# Patient Record
Sex: MALE | Race: WHITE | URBAN - METROPOLITAN AREA
[De-identification: names, ages, dates, MRNs, and addresses within clinical notes are randomized per-mention and may not be internally consistent; named-entity substitution may affect disease eponyms.]

---

## 2017-05-08 ENCOUNTER — OFFICE VISIT (OUTPATIENT)
Dept: DERMATOLOGY | Facility: AMBULATORY SURGERY CENTER | Age: 67
End: 2017-05-08

## 2017-05-08 VITALS
BODY MASS INDEX: 31.15 KG/M2 | HEART RATE: 54 BPM | RESPIRATION RATE: 19 BRPM | HEIGHT: 72 IN | DIASTOLIC BLOOD PRESSURE: 84 MMHG | WEIGHT: 230 LBS | TEMPERATURE: 98 F | SYSTOLIC BLOOD PRESSURE: 119 MMHG

## 2017-05-08 DIAGNOSIS — C44.99 ECCRINE POROCARCINOMA OF SKIN: Primary | ICD-10-CM

## 2017-05-08 RX ORDER — CANDESARTAN 16 MG/1
TABLET ORAL DAILY
COMMUNITY

## 2017-05-08 RX ORDER — BISMUTH SUBSALICYLATE 262 MG
1 TABLET,CHEWABLE ORAL DAILY
COMMUNITY

## 2017-05-08 RX ORDER — LANOLIN ALCOHOL/MO/W.PET/CERES
1 CREAM (GRAM) TOPICAL DAILY
COMMUNITY

## 2017-05-08 RX ORDER — HYDROCHLOROTHIAZIDE 25 MG/1
TABLET ORAL
COMMUNITY
Start: 2017-04-06

## 2017-05-08 RX ORDER — GUAIFENESIN 100 MG/5ML
81 LIQUID (ML) ORAL DAILY
COMMUNITY

## 2017-05-08 RX ORDER — ASCORBIC ACID 500 MG
TABLET ORAL
COMMUNITY

## 2017-05-08 RX ORDER — AZITHROMYCIN 250 MG/1
TABLET, FILM COATED ORAL
Qty: 6 TAB | Refills: 1 | Status: SHIPPED | OUTPATIENT
Start: 2017-05-08 | End: 2020-04-30

## 2017-05-08 NOTE — PROGRESS NOTES
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 This has been present for at least one year as a small raised papule which bled intermittently, not previously treated. Biopsy confirmed the diagnosis and he presents to discuss surgical management. He is feeling well and in his usual state of health today. He has no pain, no current illnesses, no other skin concerns. His allergies medications medical and social history are reviewed by me today. I have reviewed the history, physical exam, assessment and plan by Beth Hitchcock NP and agree. He is awake alert gentleman in no distress. I examined his right lower extremity concentrating on the foot. There is no right inguinal or popliteal lymphadenopathy. His right sole of the foot has an 8 x 5 mm small mobile papule which he confirms that the location of the biopsy proven skin lesion. Eccrine porocarcinoma of the right foot. We discussed the diagnosis and indications for treatment with Mohs surgery, which is the site of the lesion and its unusual histology. The procedure was discussed. Anticipated primary closure of the wound was reviewed, his questions and concerns were answered. Surgery scheduled next week.

## 2017-05-08 NOTE — PROGRESS NOTES
Written by Rachid Ellis, as dictated by Shannan Rosenbaum [facility], [address]. Name: Antonio Rosen       Age: 61 y.o. Date: 5/8/2017    Chief Complaint: had concerns including Skin Exam.    Subjective:    HPI:  Mr.. Antonio Rosen is a 61 y.o. male who was kindly referred by Dr. Vesta Geller and presents for a consult prior to Mohs surgery for a lesion on the right medial plantar foot. The lesion appeared approximately 1 year ago. The patient has not had any prior treatment. He believed this was a slightly raised bed of capillaries that broke and bled on 2 occasions in the last year. The patient's pathology report confirms eccrine porocarcinoma arising in a poroma. He reports a history of venous stasis ulcers on that ankle. He is recently retired and has numerous vacations and activities planned for the remaining year. ROS: Consitutional: No fevers, chills  Resp: chest congestion   Dermatological : positive for - Eccrine porocarcinoma    Social History     Social History    Marital status:      Spouse name: N/A    Number of children: N/A    Years of education: N/A     Occupational History    Not on file. Social History Main Topics    Smoking status: Never Smoker    Smokeless tobacco: Never Used    Alcohol use Yes      Comment: occasional    Drug use: No    Sexual activity: Not on file     Other Topics Concern    Not on file     Social History Narrative    No narrative on file       No family history on file. Past Medical History:   Diagnosis Date    Essential hypertension     Hepatic cyst     Renal cyst     Skin cancer     Sun-damaged skin     Venous stasis        Past Surgical History:   Procedure Laterality Date    AMB POC MOHS 1 STAGE H/N/HF/G         Current Outpatient Prescriptions   Medication Sig Dispense Refill    hydroCHLOROthiazide (HYDRODIURIL) 25 mg tablet       candesartan (ATACAND) 16 mg tablet Take  by mouth daily.       glucosamine-chondroitin (ARTHX) 500-400 mg cap Take 1 Cap by mouth daily.  fish oil-omega-3 fatty acids (FISH OIL) 300-500 mg cap Take  by mouth.  multivitamin (ONE A DAY) tablet Take 1 Tab by mouth daily.  aspirin 81 mg chewable tablet Take 81 mg by mouth daily.  ascorbic acid, vitamin C, (VITAMIN C) 500 mg tablet Take  by mouth.  POTASSIUM GLUCONATE PO Take  by mouth. No Known Allergies      Objective:    Visit Vitals    /84 (BP 1 Location: Left arm, BP Patient Position: Sitting)    Pulse (!) 54    Temp 98 °F (36.7 °C) (Oral)    Resp 19    Ht 6' (1.829 m)    Wt 230 lb (104.3 kg)    BMI 31.19 kg/m2       Germania Mandel is a 61 y.o. male who appears well and in no distress. He is awake, alert, and oriented. There is no right inguinal or popliteal lymphadenopathy. A limited skin evaluation was completed including his right plantar foot. There is an 8 x 6 mm skin toned papular lesion with 4 mm central vascular appearing area on his right medial plantar mid foot. This is well healed from biopsy      Assessment/Plan:    1. Porocarcinoma, right medial plantar foot. The diagnosis and process of Mohs surgery with primary or partial closure was discussed. I reviewed cure rate and risks. Healing time and limitations on activity post-surgery were discussed. The patient's questions were answered and he understands and agrees with the management plan. This plan was reviewed with the patient and patient agrees. All questions were answered. This scribe documentation was reviewed by me and accurately reflects the examination and decisions made by me.

## 2017-05-08 NOTE — MR AVS SNAPSHOT
Visit Information Date & Time Provider Department Dept. Phone Encounter #  
 5/8/2017  8:30 AM [id number] [id number]  Your Appointments 5/16/2017 10:30 AM  
SURGERY with MD Tamica Hale [address] [facility]) Appt Note: BCC (RH) Medial Plantar Mid Foot--Dr. Laguerre [facility] [id number] [place] [id number] Upcoming Health Maintenance Date Due Hepatitis C Screening 1950 DTaP/Tdap/Td series (1 - Tdap) 5/9/1973 FOBT Q 1 YEAR AGE 50-75 5/9/2002 ZOSTER VACCINE AGE 60> 5/9/2012 INFLUENZA AGE 9 TO ADULT 8/1/2017 Allergies as of 5/8/2017  Review Complete On: 5/8/2017 By: Clay Amado MD  
 No Known Allergies Current Immunizations  Never Reviewed No immunizations on file. Not reviewed this visit Vitals BP Pulse Temp Resp Height(growth percentile) Weight(growth percentile) 119/84 (BP 1 Location: Left arm, BP Patient Position: Sitting) (!) 54 98 °F (36.7 °C) (Oral) 19 6' (1.829 m) 230 lb (104.3 kg) BMI Smoking Status 31.19 kg/m2 Never Smoker Vitals History BMI and BSA Data Body Mass Index Body Surface Area  
 31.19 kg/m 2 2.3 m 2 Your Updated Medication List  
  
   
This list is accurate as of: 5/8/17  8:59 AM.  Always use your most recent med list.  
  
  
  
  
 aspirin 81 mg chewable tablet Take 81 mg by mouth daily. ATACAND 16 mg tablet Generic drug:  candesartan Take  by mouth daily. FISH -500 mg Cap Generic drug:  fish oil-omega-3 fatty acids Take  by mouth. glucosamine-chondroitin 500-400 mg Cap Commonly known as:  20 [facility] Take 1 Cap by mouth daily. hydroCHLOROthiazide 25 mg tablet Commonly known as:  HYDRODIURIL  
  
 multivitamin tablet Commonly known as:  ONE A DAY Take 1 Tab by mouth daily. POTASSIUM GLUCONATE PO Take  by mouth. VITAMIN C 500 mg tablet Generic drug:  ascorbic acid (vitamin C) Take  by mouth. Introducing [facility] & HEALTH SERVICES! Nedaelise Cool introduces [employer] patient portal. Now you can access parts of your medical record, email your doctor's office, and request medication refills online. 1. In your internet browser, go to [email or web address] 2. Click on the First Time User? Click Here link in the Sign In box. You will see the New Member Sign Up page. 3. Enter your [employer] Access Code exactly as it appears below. You will not need to use this code after youve completed the sign-up process. If you do not sign up before the expiration date, you must request a new code. · [employer] Access Code: [email or web address] Expires: 8/6/2017  8:41 AM 
 
4. Enter the last four digits of your Social Security Number (xxxx) and Date of Birth (mm/dd/yyyy) as indicated and click Submit. You will be taken to the next sign-up page. 5. Create a [employer] too ID. This will be your [employer] login ID and cannot be changed, so think of one that is secure and easy to remember. 6. Create a [employer] too password. You can change your password at any time. 7. Enter your Password Reset Question and Answer. This can be used at a later time if you forget your password. 8. Enter your e-mail address. You will receive e-mail notification when new information is available in [address] 9. Click Sign Up. You can now view and download portions of your medical record. 10. Click the Download Summary menu link to download a portable copy of your medical information. If you have questions, please visit the Frequently Asked Questions section of the [employer] website. Remember, [employer] is NOT to be used for urgent needs. For medical emergencies, dial 911. Now available from your iPhone and Android! Please provide this summary of care documentation to your next provider. If you have any questions after today's visit, please call [phone].

## 2017-05-16 ENCOUNTER — OFFICE VISIT (OUTPATIENT)
Dept: DERMATOLOGY | Facility: AMBULATORY SURGERY CENTER | Age: 67
End: 2017-05-16

## 2017-05-16 VITALS
BODY MASS INDEX: 31.15 KG/M2 | WEIGHT: 230 LBS | DIASTOLIC BLOOD PRESSURE: 84 MMHG | TEMPERATURE: 98.1 F | HEART RATE: 62 BPM | HEIGHT: 72 IN | OXYGEN SATURATION: 97 % | SYSTOLIC BLOOD PRESSURE: 132 MMHG

## 2017-05-16 DIAGNOSIS — C44.799: Primary | ICD-10-CM

## 2017-05-16 DIAGNOSIS — C44.99 ECCRINE POROCARCINOMA OF SKIN: ICD-10-CM

## 2017-05-16 DIAGNOSIS — C44.702: ICD-10-CM

## 2017-05-16 RX ORDER — CANDESARTAN 32 MG/1
TABLET ORAL
COMMUNITY
Start: 2017-03-27 | End: 2020-04-30

## 2017-05-16 RX ORDER — LIDOCAINE HYDROCHLORIDE AND EPINEPHRINE 10; 10 MG/ML; UG/ML
3 INJECTION, SOLUTION INFILTRATION; PERINEURAL ONCE
Qty: 3 ML | Refills: 0
Start: 2017-05-16 | End: 2017-05-16

## 2017-05-16 RX ORDER — BUPIVACAINE HYDROCHLORIDE AND EPINEPHRINE 2.5; 5 MG/ML; UG/ML
INJECTION, SOLUTION INFILTRATION; PERINEURAL
Qty: 1.5 ML | Refills: 0
Start: 2017-05-16

## 2017-05-16 RX ORDER — AMOXICILLIN AND CLAVULANATE POTASSIUM 875; 125 MG/1; MG/1
TABLET, FILM COATED ORAL
Refills: 0 | COMMUNITY
Start: 2017-05-12 | End: 2020-04-30

## 2017-05-16 NOTE — MR AVS SNAPSHOT
Visit Information Date & Time Provider Department Dept. Phone Encounter #  
 5/16/2017 10:30 AM Tiki Izquierdo MD [facility] [address]057 [phone] [id number] Upcoming Health Maintenance Date Due Hepatitis C Screening 1950 DTaP/Tdap/Td series (1 - Tdap) 5/9/1973 FOBT Q 1 YEAR AGE 50-75 5/9/2002 ZOSTER VACCINE AGE 60> 5/9/2012 GLAUCOMA SCREENING Q2Y 5/9/2017 Pneumococcal 65+ High/Highest Risk (1 of 2 - PCV13) 5/9/2017 MEDICARE YEARLY EXAM 5/9/2017 INFLUENZA AGE 9 TO ADULT 8/1/2017 Allergies as of 5/16/2017  Review Complete On: 5/16/2017 By: Tiki Izquierdo MD  
 No Known Allergies Current Immunizations  Never Reviewed No immunizations on file. Not reviewed this visit You Were Diagnosed With   
  
 Codes Comments Eccrine porocarcinoma of skin    -  Primary ICD-10-CM: C44.99 
ICD-9-CM: 173.99 Vitals BP Pulse Temp Height(growth percentile) Weight(growth percentile) SpO2  
 132/84 (BP 1 Location: Left arm, BP Patient Position: Sitting) 62 98.1 °F (36.7 °C) (Oral) 6' (1.829 m) 230 lb (104.3 kg) 97% BMI Smoking Status 31.19 kg/m2 Never Smoker Vitals History BMI and BSA Data Body Mass Index Body Surface Area  
 31.19 kg/m 2 2.3 m 2 Preferred Pharmacy Pharmacy Name Phone [facility], [address] [place] [address] [place] AT [address] [place] [address] Road [phone] Your Updated Medication List  
  
   
This list is accurate as of: 5/16/17 11:10 AM.  Always use your most recent med list.  
  
  
  
  
 amoxicillin-clavulanate 875-125 mg per tablet Commonly known as:  AUGMENTIN  
TK 1 T PO BID FOR 10 DAYS  
  
 aspirin 81 mg chewable tablet Take 81 mg by mouth daily. * ATACAND 16 mg tablet Generic drug:  candesartan Take  by mouth daily. * candesartan 32 mg tablet Commonly known as:  ATACAND  
  
 azithromycin 250 mg tablet Commonly known as:  Erasto Cedeño 2 po now then 1 po until finished  Indications: bronchitis FISH -500 mg Cap Generic drug:  fish oil-omega-3 fatty acids Take  by mouth. glucosamine-chondroitin 500-400 mg Cap Commonly known as:  20 [facility] Take 1 Cap by mouth daily. hydroCHLOROthiazide 25 mg tablet Commonly known as:  HYDRODIURIL  
  
 multivitamin tablet Commonly known as:  ONE A DAY Take 1 Tab by mouth daily. POTASSIUM GLUCONATE PO Take  by mouth. VITAMIN C 500 mg tablet Generic drug:  ascorbic acid (vitamin C) Take  by mouth. * Notice: This list has 2 medication(s) that are the same as other medications prescribed for you. Read the directions carefully, and ask your doctor or other care provider to review them with you. Patient Instructions WOUND CARE INSTRUCTIONS 1. Keep the dressing clean and dry and do not remove for 48 hours. 2. Then change the dressing once a day as follows: 
a. Wash hands before and after each dressing change. b. Remove dressing and wash site gently with mild soap and water, rinse, and pat dry. 
c. Apply an ointment (Bacitracin, Polysporin, Neosporin, Petroleum jelly or Aquaphor). d. Apply a non-stick (Telfa) dressing or Band-Aid to cover the wound. Remove pressure bandage Thursday, then wash gently and apply Vaseline and a band-aid to site daily for 1 week. 3. Watch for: BLEEDING: A small amount of drainage may occur. If bleeding occurs, elevate and rest the surgery site. Apply gauze and steady pressure for 15 minutes. If bleeding continues, call this office. INFECTION: Signs of infection include increased redness, pain, warmth, drainage of pus, and fever. If this occurs, call this office. 4. Special Instructions (follow any that are checked): ·  You have stitches that need to be removed in 0 days ·  Avoid bending at the waist and heavy lifting for two days. ·  Sleep with your head elevated for the next two nights. ·  Rest the surgery site and keep it elevated as much as possible for two days. ·  You may apply an ice-pack for 10-15 minutes every waking hour for the rest of the day. ·  Eat a soft diet and avoid hot food and hot drinks for the rest of the day. ·  Other instructions: Follow up as needed Take Tylenol for pain as needed. Once the site is healed with no remaining bandages or open areas, protect your surgical site and scar from the sun, as this area will be more sensitive. Use a broad spectrum sunscreen SPF 30 or higher daily, and a chemical free product (one containing zinc oxide or titanium dioxide) is a good choice if the area is sensitive. You may begin to gently massage the surgical site in 2-3 weeks, rubbing in a circular motion along the scar. This can help reduce swelling and thickness of a scar. A scar cream may be used beginnning 1 month after the surgery. If you have any questions or concerns, please call our office Monday through Friday at [phone]. Introducing [facility] & HEALTH SERVICES! Siria Hector introduces [employer] patient portal. Now you can access parts of your medical record, email your doctor's office, and request medication refills online. 1. In your internet browser, go to [email or web address] 2. Click on the First Time User? Click Here link in the Sign In box. You will see the New Member Sign Up page. 3. Enter your [employer] Access Code exactly as it appears below. You will not need to use this code after youve completed the sign-up process. If you do not sign up before the expiration date, you must request a new code. · [employer] Access Code: [email or web address] Expires: 8/6/2017  8:41 AM 
 
4. Enter the last four digits of your Social Security Number (xxxx) and Date of Birth (mm/dd/yyyy) as indicated and click Submit. You will be taken to the next sign-up page. 5. Create a WISHI ID. This will be your WISHI login ID and cannot be changed, so think of one that is secure and easy to remember. 6. Create a WISHI password. You can change your password at any time. 7. Enter your Password Reset Question and Answer. This can be used at a later time if you forget your password. 8. Enter your e-mail address. You will receive e-mail notification when new information is available in [address] 9. Click Sign Up. You can now view and download portions of your medical record. 10. Click the Download Summary menu link to download a portable copy of your medical information. If you have questions, please visit the Frequently Asked Questions section of the WISHI website. Remember, WISHI is NOT to be used for urgent needs. For medical emergencies, dial 911. Now available from your iPhone and Android! Please provide this summary of care documentation to your next provider. If you have any questions after today's visit, please call [phone].

## 2017-05-16 NOTE — PROGRESS NOTES
Pre-op: Patient present today for the evaluation of eccrine porocarcinoma to the right sole of foot. Procedure explained with full understanding. Vitals:    05/16/17 1019   BP: 132/84   Pulse: 62   Temp: 98.1 °F (36.7 °C)   TempSrc: Oral   SpO2: 97%   Weight: 104.3 kg (230 lb)   Height: 6' (1.829 m)     preoperatively, will continue to monitor. Post-op: Written and verbal post-op wound care instructions given to patient with full understanding of care. Surgical wound bandaged with Vaseline, Telfa, , 2x2 gauze, and coverall tape. All questions and concerns addressed. Vitals stable postoperatively.

## 2017-05-16 NOTE — PROGRESS NOTES
This note is written by Kamilah Castro, as dictated by Ibis Gill. Arun Earl MD.    CC: Eccrine porocarcinoma on the right medial plantar foot    History of present illness:     Kurtis Garay is a 74 y.o. male referred by Dr. Carlyle Paz. He has a biopsy-proven eccrine porocarcinoma on the right medial plantar foot. This is a new eccrine porocarcinoma present for approximately 1 year, no prior treatment. He believed it was a slightly raised bed of capillaries that bled occasionally. Biopsy confirmed the diagnosis of eccrine porocarcinoma, and I reviewed the written pathology report. He is feeling well and in his usual state of health today. He has no pain, no current illnesses, no other skin concerns. His allergies, medications, medical, and social history are reviewed by me today. His wife is with him. Exam:     He is an awake, alert, and oriented 74 y.o. male who appears well and in no distress. I examined his right foot. He has a 6 x 3 mm pink papule on his right medial plantar foot. He confirms location. Assessment/plan:    1. Eccrine porocarcinoma, right medial foot. I discussed the diagnosis of eccrine poroarcinoma and summarized the pathology report. Mohs surgery is indicated by site and an unusual histology. The procedure was discussed, verbal and written consent were obtained. I performed the procedure. Two stages were required to reach a tumor free plane. The surgical defect was managed with intermediate repair. There were no complications. He will follow up as needed as the site heals. He asks that we send a copy of this note to Dr. Robe Ignacio at [facility]. Indications, risks, and options were discussed with Kurtis Garay preoperatively. Risks including, but not limited to: pain, bleeding, infection, tumor recurrence, scarring and damage to motor and/or sensory nerves, were discussed. Kurtis Garay chose Mohs surgery.  Kurtis Garay was an acceptable surgery candidate. Mina Perez was placed in the appropriate position on the operating table in the Mohs surgery procedure room. The area was prepped and draped in the standard manner. Gentian violet was used to outline the clinical margins of the tumor. Local anesthesia was then obtained. The grossly visible tumor was then removed, an underlying layer was excised and mapped according to the Mohs technique, and the individual specimens examined microscopically. The process was repeated until microscopic examination of the tissue specimens confirmed a tumor-free plane. Hemostasis was obtained with electrosurgery and pressure. The wound was covered between stages with moist saline gauze. Wound care instructions (written and verbal) and a follow up appointment were given to Mina Perez before discharge. Mina Perez was discharged in good condition. The wound management options of second intent healing, layered closure, local flap, and/or full thickness skin graft were discussed. Mina Perez understands the aims, risks, alternatives, and possible complications and elects to proceed with an intermediate layered closure. Wound margins were made vertical, edges undermined in the subcutaneous plane, standing cones corrected at both poles followed by layered closure. The wound was closed with buried 4-0 polysorb suture in the subcutis to reduce tension on the skin edges, and skin edges were approximated with 4-0 polysorb suture in the dermis to reduce tension on the epidermis. The final closure length was 18 mm. The wound was bandaged with a pressure bandage utilizing Petroleum ointment, Telfa, gauze and Coverroll. Wound care instructions (written and verbal) and a follow up appointment were given to Mina Perez before discharge. Mina Perez was discharged in good condition. 2. History of skin cancer.  I discussed the diagnosis and recommend routine examinations with  Marlene for surveillance. The documentation recorded by the scribe accurately reflects the service I personally performed and the decisions made by me. [facility]   OFFICE PROCEDURE PROGRESS NOTE     Chart reviewed for the following:     Moni Mcbride MD, have reviewed the History, Physical and updated the Allergic reactions for [address] performed immediately prior to start of procedure:     Moni Mcbride MD, have performed the following reviews on Jorge Quiroz prior to the start of the procedure:     * Patient was identified by name and date of birth   * Agreement on procedure being performed was verified   * Risks and Benefits explained to the patient   * Procedure site verified and marked as necessary   * Patient was positioned for comfort   * Consent was signed and verified     Time: 10:45  Date of procedure: 5/16/2017  Procedure performed by: Raymond Christian.  La Mcbride MD   Provider assisted by: LPN   Patient assisted by: self   How tolerated by patient: tolerated the procedure well with no complications   Comments: none

## 2017-05-16 NOTE — PATIENT INSTRUCTIONS
WOUND CARE INSTRUCTIONS    1. Keep the dressing clean and dry and do not remove for 48 hours. 2. Then change the dressing once a day as follows:  a. Wash hands before and after each dressing change. b. Remove dressing and wash site gently with mild soap and water, rinse, and pat dry.  c. Apply an ointment (Bacitracin, Polysporin, Neosporin, Petroleum jelly or Aquaphor). d. Apply a non-stick (Telfa) dressing or Band-Aid to cover the wound. Remove pressure bandage Thursday, then wash gently and apply Vaseline and a band-aid to site daily for 1 week. 3. Watch for:  BLEEDING: A small amount of drainage may occur. If bleeding occurs, elevate and rest the surgery site. Apply gauze and steady pressure for 15 minutes. If bleeding continues, call this office. INFECTION: Signs of infection include increased redness, pain, warmth, drainage of pus, and fever. If this occurs, call this office. 4. Special Instructions (follow any that are checked):  · [] You have stitches that need to be removed in 0 days  · [] Avoid bending at the waist and heavy lifting for two days. · [] Sleep with your head elevated for the next two nights. · [x] Rest the surgery site and keep it elevated as much as possible for two days. · [x] You may apply an ice-pack for 10-15 minutes every waking hour for the rest of the day. · [] Eat a soft diet and avoid hot food and hot drinks for the rest of the day. · [] Other instructions: Follow up as needed  Take Tylenol for pain as needed. Once the site is healed with no remaining bandages or open areas, protect your surgical site and scar from the sun, as this area will be more sensitive. Use a broad spectrum sunscreen SPF 30 or higher daily, and a chemical free product (one containing zinc oxide or titanium dioxide) is a good choice if the area is sensitive. You may begin to gently massage the surgical site in 2-3 weeks, rubbing in a circular motion along the scar.  This can help reduce swelling and thickness of a scar. A scar cream may be used beginnning 1 month after the surgery. If you have any questions or concerns, please call our office Monday through Friday at [phone].

## 2018-12-29 ENCOUNTER — ED HISTORICAL/CONVERTED ENCOUNTER (OUTPATIENT)
Dept: OTHER | Age: 68
End: 2018-12-29

## 2020-04-24 ENCOUNTER — TELEPHONE (OUTPATIENT)
Dept: HEMATOLOGY | Age: 70
End: 2020-04-24

## 2020-04-30 ENCOUNTER — VIRTUAL VISIT (OUTPATIENT)
Dept: HEMATOLOGY | Age: 70
End: 2020-04-30

## 2020-04-30 DIAGNOSIS — Q44.6 POLYCYSTIC LIVER DISEASE: Primary | ICD-10-CM

## 2020-04-30 PROBLEM — I87.2 VENOUS INSUFFICIENCY OF BOTH LOWER EXTREMITIES: Status: ACTIVE | Noted: 2020-04-30

## 2020-04-30 PROBLEM — I10 HYPERTENSION: Status: ACTIVE | Noted: 2020-04-30

## 2020-04-30 PROBLEM — Q61.3 POLYCYSTIC RENAL DISEASE: Status: ACTIVE | Noted: 2020-04-30

## 2020-04-30 NOTE — PROGRESS NOTES
[address] [facility], Randell RIVERA MD Gracelyn Hover, PA-C Cristobal Russel, [facility]P-BC     April S Prieto, [facility]   Ricco Pino LUKAS-IESHA Hector, [facility]       Almaz Lainez [facility] 136    at [address], 81 [facility], [facility] 22.    [phone]    FAX: [address] [place] [address], [address]    [phone]    FAX: [phone]       Patient Care Team:  Angelika Pinedo MD as PCP - General (Family Practice)      Problem List  Date Reviewed: 4/30/2020          Codes Class Noted    Polycystic liver disease ICD-10-CM: Q44.6  ICD-9-CM: 751.62  4/30/2020        Hypertension ICD-10-CM: I10  ICD-9-CM: 401.9  4/30/2020        Polycystic renal disease ICD-10-CM: Q61.3  ICD-9-CM: 753.12  4/30/2020        Venous insufficiency of both lower extremities ICD-10-CM: I87.2  ICD-9-CM: 459.81  4/30/2020              VIRTUAL TELEHEALTH VISIT PERFORMED DUE TO COVID-19 EPIDEMIC    CONSENT:  Nabil Galloway, who was seen by synchronous, real-time, audio-video technology, and/or his healthcare decision maker, is aware that this patient-initiated, Telehealth encounter on 4/30/2020 is a billable service, with coverage as determined by his insurance carrier. He is aware that he may receive a bill and has provided verbal consent to proceed. This patient was evaluated during a Virtual Telehealth visit. A caregiver was present if appropriate. Due to this being a TeleHealth encounter performed during the [facility] public health emergency, the physical examination was limited to that listed in the [address]. comes to see me for a second opinion regarding polycystic liver disease.   All medical records sent by the referring physicians were reviewed including imaging studies     The patient is a 81 y.o.  male without any history of previous liver disease. The patient underwent an ultrasound and CT for evaluation of non-specific abdominal pain right. This demonstrated polycystic liver disease with numerous cysts of various sizes and polycytic renal disease. There is a single large liver cyst measuring 10 x 11 cm. The patient has the following symptoms which are thought to be due to the liver disease:  Fullness in the abdomen, early satiety, increased GERD. The patient is not currently experiencing the following symptoms of liver disease:  pain in the right side over the liver,     The patient completes all daily activities without any functional limitations. ASSESSMENT AND PLAN:  Polycystic liver disease   The patient has innumerable cysts of varying sizes throughout out the entire liver. Liver transaminases are normal.  ALP is normal.  Liver function is normal.  The platelet count is normal.      Liver cysts are common and benign over 99% of the time. They generally do not grow, but may may get larger over time. Patients with polycystic liver disease never develop liver dysfunction and the cysts do not lead to liver failure or cirrhosis. These patients can develop massive hepatomegaly from all the cysts which can be both disfiguring and uncomfortable from severe abdominal distention. Surgical resection of the cysts is not possible since they are throughout the liver and if only a part of the liver is resected it will regrow with cysts. The patient does have one large dominant cyst.  The reports do not indicate the location of this one cyst.  If in the left lobe this could be causing the symptoms of early satiety and GERD. This one cyst could be aspirated to se if the symptoms resolve.   The cyst will eventually refill with fluid and if it then causes symptoms again this could be surgically resected. Screening for Hepatocellular Carcinoma  HCC screening is not necessary since patients with PCLD do not develop HCC. Treatment of other medical problems in patients with chronic liver disease  There are no contraindications for the patient to take most medications that are necessary for treatment of other medical issues. Counseling for alcohol in patients with chronic liver disease  The patient was counseled regarding alcohol consumption and the effect of alcohol on chronic liver disease. The patient does not consume any significant amount of alcohol. Vaccinations   Routine vaccinations against other bacterial and viral agents can be performed as indicated. Annual flu vaccination should be administered if indicated. ALLERGIES  No Known Allergies    MEDICATIONS  Current Outpatient Medications   Medication Sig    hydroCHLOROthiazide (HYDRODIURIL) 25 mg tablet     candesartan (ATACAND) 16 mg tablet Take  by mouth daily.  multivitamin (ONE A DAY) tablet Take 1 Tab by mouth daily.  aspirin 81 mg chewable tablet Take 81 mg by mouth daily.  ascorbic acid, vitamin C, (VITAMIN C) 500 mg tablet Take  by mouth.  bupivacaine-EPINEPHrine (MARCAINE-EPINEPHRINE) 0.25 %-1:200,000 soln Used for mohs surgery  Indications: LOCAL ANESTHESIA FOR PROCEDURES    glucosamine-chondroitin (ARTHX) 500-400 mg cap Take 1 Cap by mouth daily.  fish oil-omega-3 fatty acids (FISH OIL) 300-500 mg cap Take  by mouth.  POTASSIUM GLUCONATE PO Take  by mouth. No current facility-administered medications for this visit. SYSTEM REVIEW NOT RELATED TO LIVER DISEASE OR REVIEWED ABOVE:  Constitution systems: Negative for fever, chills, weight gain, weight loss. Eyes: Negative for visual changes. ENT: Negative for sore throat, painful swallowing. Respiratory: Negative for cough, hemoptysis, SOB. Cardiology: Negative for chest pain, palpitations.   GI:  Negative for constipation or diarrhea. : Negative for urinary frequency, dysuria, hematuria, nocturia. Skin: Negative for rash. Hematology: Negative for easy bruising, blood clots. Musculo-skelatal: Negative for back pain, muscle pain, weakness. Neurologic: Negative for headaches, dizziness, vertigo, memory problems not related to HE. Psychology: Negative for anxiety, depression. FAMILY HISTORY:  The father had the following following chronic disease(s): Polycystic liver disease. The daughter had polycystic liver disease. SOCIAL HISTORY:  The patient is . The patient has 1 child. The patient has never used tobacco products. The patient has never consumed significant amounts of alcohol. 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 Eyes: Sclera anicteric. ENT: No oral lesions. Skin: No rashes. spider angiomata. No jaundice. Abdomen: No obvious distention suggesting ascites. Extremities: No edema. No muscle wasting. Neurologic: Alert and oriented. Cranial nerves grossly intact. LABORATORY STUDIES:  From 12/2019  AST/ALT/ALP/T Bili/ALB:  28/30/135/0.5/4.2  WBC/HB/PLT/INR:  5.0/12.6/58  NA/BUN/CREAT:  141/20/    SEROLOGIES:  Not available or performed. Testing was performed today. LIVER HISTOLOGY:  Not available or performed    ENDOSCOPIC PROCEDURES:  Not available or performed    RADIOLOGY:  1/2020. Ultrasound of liver. Normal appearing liver. Numerous cysts throughout the liver. The largest cyst is 11 x 10 cm. Multiple cysts in the kidneys.     OTHER TESTING:  Not available or performed    FOLLOW-UP:  Pursuant to the emergency declaration under the [facility] waiver authority and the [employer] Act, this Virtual  Visit was conducted, with the patient's (and/or their legal guardian's) consent, to reduce the patient's risk of exposure to COVID-19 and provide necessary medical care. Services were provided through a video synchronous discussion virtually to substitute for an in-person clinic visit. The patient was located in their home. The provider was located in the [place] [address] office. FOLLOW-UP:  All of the issues listed above in the Assessment and Plan were discussed with the patient. All questions were answered. The patient expressed a clear understanding of the above. No follow-up at [address] is needed. I would be glad to see the patient back for follow-up at any time in the future if the clinical situation changes.       MD Lupe GoddardThe [facility] 13 of [address] [place] [address] A, [address] [place] [address] 22.  [phone]  [address] [place] [address]

## 2022-03-18 PROBLEM — Q44.6 POLYCYSTIC LIVER DISEASE: Status: ACTIVE | Noted: 2020-04-30

## 2022-03-19 PROBLEM — I10 HYPERTENSION: Status: ACTIVE | Noted: 2020-04-30

## 2022-03-19 PROBLEM — I87.2 VENOUS INSUFFICIENCY OF BOTH LOWER EXTREMITIES: Status: ACTIVE | Noted: 2020-04-30

## 2022-03-19 PROBLEM — Q61.3 POLYCYSTIC RENAL DISEASE: Status: ACTIVE | Noted: 2020-04-30

## 2022-09-07 ENCOUNTER — TELEPHONE (OUTPATIENT)
Dept: HEMATOLOGY | Age: 72
End: 2022-09-07

## 2022-09-07 NOTE — TELEPHONE ENCOUNTER
Patient called to schedule a F/U with MLS LOV 4/30/2020. He's having some minimal pain and wanted to get it checked.   He wants to know if he needs to have an US since it's been so long

## 2022-11-15 ENCOUNTER — OFFICE VISIT (OUTPATIENT)
Dept: HEMATOLOGY | Age: 72
End: 2022-11-15
Payer: MEDICARE

## 2022-11-15 VITALS
RESPIRATION RATE: 17 BRPM | WEIGHT: 231 LBS | BODY MASS INDEX: 31.29 KG/M2 | DIASTOLIC BLOOD PRESSURE: 87 MMHG | HEART RATE: 61 BPM | SYSTOLIC BLOOD PRESSURE: 165 MMHG | HEIGHT: 72 IN | OXYGEN SATURATION: 100 % | TEMPERATURE: 97.2 F

## 2022-11-15 DIAGNOSIS — Q44.6 POLYCYSTIC LIVER DISEASE: Primary | ICD-10-CM

## 2022-11-15 PROBLEM — M23.205 OLD BUCKET HANDLE TEAR OF MEDIAL MENISCUS: Status: ACTIVE | Noted: 2022-11-15

## 2022-11-15 PROBLEM — J30.9 ALLERGIC RHINITIS: Status: ACTIVE | Noted: 2022-11-15

## 2022-11-15 PROBLEM — H52.229 REGULAR ASTIGMATISM: Status: ACTIVE | Noted: 2022-11-15

## 2022-11-15 PROBLEM — I25.10 CORONARY ATHEROSCLEROSIS: Status: ACTIVE | Noted: 2022-11-15

## 2022-11-15 PROBLEM — M79.609 PAIN IN SOFT TISSUES OF LIMB: Status: ACTIVE | Noted: 2022-11-15

## 2022-11-15 PROBLEM — M77.10 LATERAL EPICONDYLITIS OF ELBOW: Status: ACTIVE | Noted: 2022-11-15

## 2022-11-15 PROBLEM — H52.4 PRESBYOPIA: Status: ACTIVE | Noted: 2022-11-15

## 2022-11-15 PROBLEM — H52.10 MYOPIA: Status: ACTIVE | Noted: 2022-11-15

## 2022-11-15 PROBLEM — M23.50: Status: ACTIVE | Noted: 2022-11-15

## 2022-11-15 PROBLEM — I10 ESSENTIAL HYPERTENSION: Status: ACTIVE | Noted: 2017-08-11

## 2022-11-15 PROBLEM — L98.8 OTHER SPECIFIED DERMATOSES: Status: ACTIVE | Noted: 2022-11-15

## 2022-11-15 PROBLEM — E78.00 HYPERCHOLESTEROLEMIA: Status: ACTIVE | Noted: 2017-08-11

## 2022-11-15 PROBLEM — R49.8 OTHER VOICE DISTURBANCE: Status: ACTIVE | Noted: 2022-11-15

## 2022-11-15 PROBLEM — L98.499 CHRONIC ULCER OF SKIN (HCC): Status: ACTIVE | Noted: 2022-11-15

## 2022-11-15 PROBLEM — R21 RASH AND OTHER NONSPECIFIC SKIN ERUPTION: Status: ACTIVE | Noted: 2022-11-15

## 2022-11-15 PROBLEM — M25.569 KNEE PAIN: Status: ACTIVE | Noted: 2019-10-21

## 2022-11-15 PROBLEM — E66.9 OBESITY: Status: ACTIVE | Noted: 2022-11-15

## 2022-11-15 PROBLEM — F52.8 PSYCHOSEXUAL DYSFUNCTION WITH INHIBITED SEXUAL EXCITEMENT: Status: ACTIVE | Noted: 2022-11-15

## 2022-11-15 PROBLEM — E78.5 OTHER AND UNSPECIFIED HYPERLIPIDEMIA: Status: ACTIVE | Noted: 2022-11-15

## 2022-11-15 PROBLEM — R94.31 ABNORMAL ELECTROCARDIOGRAPHY: Status: ACTIVE | Noted: 2017-08-11

## 2022-11-15 PROBLEM — T14.8XXA CONTUSION: Status: ACTIVE | Noted: 2022-11-15

## 2022-11-15 PROBLEM — T46.5X5A: Status: ACTIVE | Noted: 2022-11-15

## 2022-11-15 PROCEDURE — 1101F PT FALLS ASSESS-DOCD LE1/YR: CPT | Performed by: INTERNAL MEDICINE

## 2022-11-15 PROCEDURE — G8754 DIAS BP LESS 90: HCPCS | Performed by: INTERNAL MEDICINE

## 2022-11-15 PROCEDURE — 3079F DIAST BP 80-89 MM HG: CPT | Performed by: INTERNAL MEDICINE

## 2022-11-15 PROCEDURE — G8417 CALC BMI ABV UP PARAM F/U: HCPCS | Performed by: INTERNAL MEDICINE

## 2022-11-15 PROCEDURE — 99214 OFFICE O/P EST MOD 30 MIN: CPT | Performed by: INTERNAL MEDICINE

## 2022-11-15 PROCEDURE — G8536 NO DOC ELDER MAL SCRN: HCPCS | Performed by: INTERNAL MEDICINE

## 2022-11-15 PROCEDURE — G0463 HOSPITAL OUTPT CLINIC VISIT: HCPCS | Performed by: INTERNAL MEDICINE

## 2022-11-15 PROCEDURE — 3074F SYST BP LT 130 MM HG: CPT | Performed by: INTERNAL MEDICINE

## 2022-11-15 PROCEDURE — G8427 DOCREV CUR MEDS BY ELIG CLIN: HCPCS | Performed by: INTERNAL MEDICINE

## 2022-11-15 PROCEDURE — 3017F COLORECTAL CA SCREEN DOC REV: CPT | Performed by: INTERNAL MEDICINE

## 2022-11-15 PROCEDURE — G8753 SYS BP > OR = 140: HCPCS | Performed by: INTERNAL MEDICINE

## 2022-11-15 PROCEDURE — 1123F ACP DISCUSS/DSCN MKR DOCD: CPT | Performed by: INTERNAL MEDICINE

## 2022-11-15 PROCEDURE — G8510 SCR DEP NEG, NO PLAN REQD: HCPCS | Performed by: INTERNAL MEDICINE

## 2022-11-15 RX ORDER — TERBINAFINE HYDROCHLORIDE 250 MG/1
250 TABLET ORAL DAILY
COMMUNITY
Start: 2022-09-01 | End: 2022-12-06

## 2022-11-15 RX ORDER — LIFITEGRAST 50 MG/ML
SOLUTION/ DROPS OPHTHALMIC
COMMUNITY
Start: 2022-11-08

## 2022-11-15 NOTE — PROGRESS NOTES
[address] \[facility]", Ronnie RIVERA, Valery Frye MD Narciso Jumper, PA-C    Jerson Lamar, ACNP-BC     Aileen Moreau, [facility]   Shruti Arroyo, FNP-C    Laurent Stout, [facility]       Almaz Lainez [facility] 136    at [address], [facility] Antonio Alston  22.    [phone]    FAX: [address] [place] [address] [address], [address] [place] [address], [address] [place] [address] Street [address]    [phone]    FAX: [phone]       Patient Care Team:  Richar Jerez MD as PCP - General (Family Medicine)      Problem List  Date Reviewed: 4/30/2020            Codes Class Noted    Allergic rhinitis ICD-10-CM: J30.9  ICD-9-CM: 477.9  11/15/2022        Chronic ulcer of skin ([facility] ICD-10-CM: L98.499  ICD-9-CM: 707.9  11/15/2022        Contusion ICD-10-CM: T14. 8XXA  ICD-9-CM: 924.9  11/15/2022        Coronary atherosclerosis ICD-10-CM: I25.10  ICD-9-CM: 414.00  11/15/2022        Pain in soft tissues of limb ICD-10-CM: M79.609  ICD-9-CM: 729.5  11/15/2022        Other and unspecified hyperlipidemia ICD-10-CM: E78.5  ICD-9-CM: 272.4  11/15/2022        Lateral epicondylitis of elbow ICD-10-CM: M77.10  ICD-9-CM: 726.32  11/15/2022    Overview Signed 11/15/2022 12:26 PM by Georgi Mcdermott     patient will consult with his physician collegues at work             Myopia ICD-10-CM: H52.10  ICD-9-CM: 367.1  11/15/2022        Obesity ICD-10-CM: E66.9  ICD-9-CM: 278.00  11/15/2022        Old bucket handle tear of medial meniscus ICD-10-CM: M23.205  ICD-9-CM: 717.0  11/15/2022        Old disruption of medial collateral ligament ICD-10-CM: M23.50  ICD-9-CM: 717.82  11/15/2022        Other antihypertensive agents causing adverse effect in therapeutic use ICD-10-CM: T46.5X5A  ICD-9-CM: E942.6  11/15/2022    Overview Signed 11/15/2022 12:26 PM by Robet Click     cough with ACE med             Other specified dermatoses ICD-10-CM: L98.8  ICD-9-CM: 702.8  11/15/2022    Overview Signed 11/15/2022 12:26 PM by Robet Click     skin lesion on Lt cheek             Other voice disturbance ICD-10-CM: R49.8  ICD-9-CM: 784.49  11/15/2022        Presbyopia ICD-10-CM: H52.4  ICD-9-CM: 367.4  11/15/2022        Psychosexual dysfunction with inhibited sexual excitement ICD-10-CM: F52.8  ICD-9-CM: 302.72  11/15/2022        Rash and other nonspecific skin eruption ICD-10-CM: R21  ICD-9-CM: 782.1  11/15/2022        Reason for consultation ICD-10-CM: Adwoa Moore  ICD-9-CM: Adwoa Moore  11/15/2022        Regular astigmatism ICD-10-CM: H52.229  ICD-9-CM: 367.21  11/15/2022    Overview Signed 11/15/2022 12:26 PM by Robet Click     Rx manifest             Polycystic liver disease ICD-10-CM: Q44.6  ICD-9-CM: 751.62  4/30/2020        Hypertension ICD-10-CM: I10  ICD-9-CM: 401.9  4/30/2020        Polycystic renal disease ICD-10-CM: Q61.3  ICD-9-CM: 753.12  4/30/2020        Venous insufficiency of both lower extremities ICD-10-CM: I87.2  ICD-9-CM: 459.81  4/30/2020        Knee pain ICD-10-CM: M25.569  ICD-9-CM: 719.46  10/21/2019    Overview Signed 11/15/2022 12:26 PM by Kallie Graf             Abnormal electrocardiography ICD-10-CM: R94.31  ICD-9-CM: 794.31  8/11/2017        Hypercholesterolemia ICD-10-CM: E78.00  ICD-9-CM: 272.0  8/11/2017        Essential hypertension ICD-10-CM: I10  ICD-9-CM: 401.9  8/11/2017    Overview Signed 11/15/2022 12:26 PM by Jameson Mann     cough with lisinopril with change meds to arb                Helder Quevedo [facility] for management of Polycystic liver disease. The active problem list, all pertinent past medical history, medications, radiologic findings and laboratory findings related to the liver disorder were reviewed and discussed with the patient. The patient is a 81 y.o.  male with polycystic liver and kidney disease. The patient underwent an ultrasound and CT for evaluation of non-specific right sided abdominal pain right in 2/2020. This demonstrated numerous cysts of various sizes in the liver and polycytic renal disease. There is a single large liver cyst measuring 10 x 11 cm. [facility] 4[facility]2020. A repeat CT scan in 11/2022 again demonstrated polycystic liver. The largest cyst in the right lobe was ~15 cm. The largest cyst in what appears to be the left lobe was 10 x 15 cm    The patient has the following symptoms which are thought to be due to the liver disease:    Fullness in the abdomen, early satiety, increased GERD. The patient is not currently experiencing the following symptoms of liver disease:    pain in the right side over the liver,     The patient completes all daily activities without any functional limitations. ASSESSMENT AND PLAN:  Polycystic liver disease   The patient has innumerable cysts of varying sizes throughout out the entire liver. The 2 largest cysts appear to have increased in size compared to 2020 when they were 12 x 13.7 cm in the right lobe and 7.1 x 10.7 cm in the left lobe. These now measures 15 cm in the right and 10 x 15 cm in the left. Liver transaminases are normal.  ALP is normal.  Liver function is normal.  The platelet count is normal.      Liver cysts are common and benign over 99% of the time. They generally do not grow, but may may get larger over time. Patients with polycystic liver disease never develop liver dysfunction and the cysts do not lead to liver failure or cirrhosis. These patients can develop massive hepatomegaly from all the cysts which can be both disfiguring and uncomfortable from severe abdominal distention. It is unlikely the 2 largest cysts can be removed. However, they can probably be drained with a marsupial procedure. However, given that there are so many cysts within the liver this may not cause any symptomatic benefit, or the benefit may be short lived as other cysts grow to take up space of the 2 large drained cysts. Will refer to Dr Anne-Marie Roberts for a discussion regarding the pros, cons and potential benefits of surgical drainage. Elevation in   This is not likely to be due to any intrahepatic or pancreatic malignancy since no liver or pancreas mass was seen. Polycystic liver disease not increase the risk of CCA. This is most likely due to the numerous cysts causing mild bile duct compression and should be monitored. Elevation in ALP  This is due to multiple cysts causing bile duct compression and is not uncommon in polycystic liver. No treatment is indicated. MRI to look at bile ducts is not likely to be useful. Chronic kidney disease  This is probably from polycystic renal disease. The serum creatinine appears to be stable in the 1.4 mg range. NSAIDs should not be utilized as this may worsen CKD. Screening for Hepatocellular Carcinoma  HCC screening is not necessary since patients with PCLD do not develop HCC. Treatment of other medical problems in patients with chronic liver disease  There are no contraindications for the patient to take most medications that are necessary for treatment of other medical issues. Counseling for alcohol in patients with chronic liver disease  The patient was counseled regarding alcohol consumption and the effect of alcohol on chronic liver disease. The patient does not consume any significant amount of alcohol. Vaccinations   Routine vaccinations against other bacterial and viral agents can be performed as indicated. Annual flu vaccination should be administered if indicated.     ALLERGIES  No Known Allergies    MEDICATIONS  Current Outpatient Medications   Medication Sig    Xiidra 5 % dpet     terbinafine HCL (LAMISIL) 250 mg tablet Take 250 mg by mouth daily. bupivacaine-EPINEPHrine (MARCAINE-EPINEPHRINE) 0.25 %-1:200,000 soln Used for mohs surgery  Indications: LOCAL ANESTHESIA FOR PROCEDURES    hydroCHLOROthiazide (HYDRODIURIL) 25 mg tablet     candesartan (ATACAND) 16 mg tablet Take  by mouth daily. glucosamine-chondroitin (ARTHX) 500-400 mg cap Take 1 Cap by mouth daily. fish oil-omega-3 fatty acids (FISH OIL) 300-500 mg cap Take  by mouth.    multivitamin (ONE A DAY) tablet Take 1 Tab by mouth daily. aspirin 81 mg chewable tablet Take 81 mg by mouth daily. ascorbic acid, vitamin C, (VITAMIN C) 500 mg tablet Take  by mouth. POTASSIUM GLUCONATE PO Take  by mouth. No current facility-administered medications for this visit. SYSTEM REVIEW NOT RELATED TO LIVER DISEASE OR REVIEWED ABOVE:  Constitution systems: Negative for fever, chills, weight gain, weight loss. Eyes: Negative for visual changes. ENT: Negative for sore throat, painful swallowing. Respiratory: Negative for cough, hemoptysis, SOB. Cardiology: Negative for chest pain, palpitations. GI:  Negative for constipation or diarrhea. : Negative for urinary frequency, dysuria, hematuria, nocturia. Skin: Negative for rash. Hematology: Negative for easy bruising, blood clots. Musculo-skelatal: Negative for back pain, muscle pain, weakness. Neurologic: Negative for headaches, dizziness, vertigo, memory problems not related to HE. Psychology: Negative for anxiety, depression. FAMILY HISTORY:  The father had the following following chronic disease(s): Polycystic liver disease. The daughter had polycystic liver disease. SOCIAL HISTORY:  The patient is . The patient has 1 child. The patient has never used tobacco products. The patient has never consumed significant amounts of alcohol. 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 Eyes: Sclera anicteric. ENT: No oral lesions. Thyroid normal.  Nodes: No adenopathy. Skin: No spider angiomata. No jaundice. No palmar erythema. Respiratory: Lungs clear to auscultation. Cardiovascular: Regular heart rate. No murmurs. No JVD. Abdomen: Hepatomegaly with nodular surface. Spleen not palpable. No obvious ascites. Extremities: No edema. No muscle wasting. No gross arthritic changes. Neurologic: Alert and oriented. Cranial nerves grossly intact. No asterixis. LABORATORY STUDIES:  Liver [place] of [address] Units 11/15/2022   WBC 3.4 - 10.8 x10E3/uL 5.7   ANC 1.4 - 7.0 x10E3/uL 3.7   HGB 13.0 - 17.7 g/dL 12.8 (L)    - 450 x10E3/uL 210   INR 0.9 - 1.2 1.0   AST 0 - 40 IU/L 26   ALT 0 - 44 IU/L 24   Alk Phos 44 - 121 IU/L 154 (H)   Bili, Total 0.0 - 1.2 mg/dL 0.6   Bili, Direct 0.00 - 0.40 mg/dL 0.17   Albumin 3.8 - 4.8 g/dL 4.8   BUN 8 - 27 mg/dL 26   Creat 0.76 - 1.27 mg/dL 1.43 (H)   Na 134 - 144 mmol/L 139   K 3.5 - 5.2 mmol/L 4.3   Cl 96 - 106 mmol/L 98   CO2 20 - 29 mmol/L 23   Glucose 70 - 99 mg/dL 98     Cancer Screening Latest Ref Rng & Units 11/15/2022   AFP, Serum 0.0 - 8.4 ng/mL 1.4   AFP-L3% 0.0 - 9.9 % Comment   CA 19-9 0 - 35 U/mL 97 (H)     SEROLOGIES:  Not available or performed. Testing was performed today. LIVER HISTOLOGY:  Not available or performed    ENDOSCOPIC PROCEDURES:  Not available or performed    RADIOLOGY:  1/2020. Ultrasound of liver. Normal appearing liver. Numerous cysts throughout the liver. The largest cyst is 11 x 10 cm. Multiple cysts in the kidneys. 11/2022. CT scan abdomen without IV contrast.  Numerous cysts throughout the liver and kidney consistent with polycystic liver and kidney disease. Largest cysts in right lobe 15 cm.   Largest cyst in left lobe 10 x 15 cm.    OTHER TESTING:  Not available or performed    FOLLOW-UP:  All of the issues listed above in the Assessment and Plan were discussed with the patient. All questions were answered. The patient expressed a clear understanding of the above. [address] [place] [address] in 6 months for routine monitoring.       Kamilah Meeks MD  [address] [place] [address] [address] 22.  [phone]  [address]

## 2022-11-15 NOTE — PROGRESS NOTES
Identified pt with two pt identifiers(name and ). Reviewed record in preparation for visit and have obtained necessary documentation. Chief Complaint   Patient presents with    Other     Polycystic liver disease   follow up, last visit 20      Vitals:    11/15/22 1224 11/15/22 1248   BP: (!) 156/101 (!) 165/87   Pulse: 61    Resp: 17    Temp: 97.2 °F (36.2 °C)    TempSrc: Temporal    SpO2: 100%    Weight: 231 lb (104.8 kg)    Height: 6' (1.829 m)    PainSc:   5    PainLoc: Abdomen      Patient reports decreased appetite and consistent upper abdominal pain due to cyst. Family history of renal cysts      Health Maintenance Review: Patient reminded of \"due or due soon\" health maintenance. I have asked the patient to contact his/her primary care provider (PCP) for follow-up on his/her health maintenance. Coordination of Care Questionnaire:  :   1) Have you been to an emergency room, urgent care, or hospitalized since your last visit? If yes, where when, and reason for visit? no       2. Have seen or consulted any other health care provider since your last visit? If yes, where when, and reason for visit? YES, CMP every 6 mo with PCP      Patient is accompanied by wife I have received verbal consent from Liliam Darden to discuss any/all medical information while they are present in the room.

## 2022-11-15 NOTE — Clinical Note
12/6/2022    Patient: Kymberly Munoz   YOB: 1950   Date of Visit: 11/15/2022     Carlos Barker MD  [address] [address]  Via Fax: [phone]    Dear Carlos Barker MD,      Thank you for [facility]. My notes for this consultation are attached. If you have questions, please do not hesitate to call me. I look forward to following your patient along with you.       Sincerely,    Ivet Ramsay MD

## 2022-11-16 LAB
AFP L3 MFR SERPL: NORMAL % (ref 0–9.9)
AFP SERPL-MCNC: 1.4 NG/ML (ref 0–8.4)
ALBUMIN SERPL-MCNC: 4.8 G/DL (ref 3.8–4.8)
ALP SERPL-CCNC: 154 IU/L (ref 44–121)
ALT SERPL-CCNC: 24 IU/L (ref 0–44)
AST SERPL-CCNC: 26 IU/L (ref 0–40)
BASOPHILS # BLD AUTO: 0 X10E3/UL (ref 0–0.2)
BASOPHILS NFR BLD AUTO: 1 %
BILIRUB DIRECT SERPL-MCNC: 0.17 MG/DL (ref 0–0.4)
BILIRUB SERPL-MCNC: 0.6 MG/DL (ref 0–1.2)
BUN SERPL-MCNC: 26 MG/DL (ref 8–27)
BUN/CREAT SERPL: 18 (ref 10–24)
CALCIUM SERPL-MCNC: 10.3 MG/DL (ref 8.6–10.2)
CANCER AG19-9 SERPL-ACNC: 97 U/ML (ref 0–35)
CHLORIDE SERPL-SCNC: 98 MMOL/L (ref 96–106)
CO2 SERPL-SCNC: 23 MMOL/L (ref 20–29)
CREAT SERPL-MCNC: 1.43 MG/DL (ref 0.76–1.27)
EGFR: 53 ML/MIN/1.73
EOSINOPHIL # BLD AUTO: 0.4 X10E3/UL (ref 0–0.4)
EOSINOPHIL NFR BLD AUTO: 6 %
ERYTHROCYTE [DISTWIDTH] IN BLOOD BY AUTOMATED COUNT: 13.6 % (ref 11.6–15.4)
GLUCOSE SERPL-MCNC: 98 MG/DL (ref 70–99)
HCT VFR BLD AUTO: 37.2 % (ref 37.5–51)
HGB BLD-MCNC: 12.8 G/DL (ref 13–17.7)
IMM GRANULOCYTES # BLD AUTO: 0 X10E3/UL (ref 0–0.1)
IMM GRANULOCYTES NFR BLD AUTO: 0 %
INR PPP: 1 (ref 0.9–1.2)
LYMPHOCYTES # BLD AUTO: 1 X10E3/UL (ref 0.7–3.1)
LYMPHOCYTES NFR BLD AUTO: 18 %
MCH RBC QN AUTO: 31.4 PG (ref 26.6–33)
MCHC RBC AUTO-ENTMCNC: 34.4 G/DL (ref 31.5–35.7)
MCV RBC AUTO: 91 FL (ref 79–97)
MONOCYTES # BLD AUTO: 0.5 X10E3/UL (ref 0.1–0.9)
MONOCYTES NFR BLD AUTO: 9 %
NEUTROPHILS # BLD AUTO: 3.7 X10E3/UL (ref 1.4–7)
NEUTROPHILS NFR BLD AUTO: 66 %
PLATELET # BLD AUTO: 210 X10E3/UL (ref 150–450)
POTASSIUM SERPL-SCNC: 4.3 MMOL/L (ref 3.5–5.2)
PROT SERPL-MCNC: 7.4 G/DL (ref 6–8.5)
PROTHROMBIN TIME: 10.9 SEC (ref 9.1–12)
RBC # BLD AUTO: 4.08 X10E6/UL (ref 4.14–5.8)
SODIUM SERPL-SCNC: 139 MMOL/L (ref 134–144)
WBC # BLD AUTO: 5.7 X10E3/UL (ref 3.4–10.8)

## 2022-11-17 ENCOUNTER — HOSPITAL ENCOUNTER (OUTPATIENT)
Dept: CT IMAGING | Age: 72
Discharge: HOME | End: 2022-11-17
Attending: NURSE PRACTITIONER
Payer: MEDICARE

## 2022-11-17 DIAGNOSIS — Q44.6 POLYCYSTIC LIVER DISEASE: ICD-10-CM

## 2022-11-17 PROCEDURE — 74170 CT ABD WO CNTRST FLWD CNTRST: CPT

## 2022-11-17 PROCEDURE — 74011000636 HC RX REV CODE- 636: Performed by: NURSE PRACTITIONER

## 2022-11-17 RX ADMIN — IOPAMIDOL 100 ML: 755 INJECTION, SOLUTION INTRAVENOUS at 18:15

## 2022-12-01 ENCOUNTER — OFFICE VISIT (OUTPATIENT)
Dept: SURGERY | Age: 72
End: 2022-12-01
Payer: MEDICARE

## 2022-12-01 VITALS
OXYGEN SATURATION: 95 % | HEIGHT: 72 IN | RESPIRATION RATE: 19 BRPM | BODY MASS INDEX: 31.77 KG/M2 | TEMPERATURE: 97.6 F | WEIGHT: 234.6 LBS | HEART RATE: 63 BPM | SYSTOLIC BLOOD PRESSURE: 133 MMHG | DIASTOLIC BLOOD PRESSURE: 82 MMHG

## 2022-12-01 DIAGNOSIS — Q44.6 POLYCYSTIC LIVER DISEASE: Primary | ICD-10-CM

## 2022-12-01 PROCEDURE — 3017F COLORECTAL CA SCREEN DOC REV: CPT | Performed by: SURGERY

## 2022-12-01 PROCEDURE — 99205 OFFICE O/P NEW HI 60 MIN: CPT | Performed by: SURGERY

## 2022-12-01 PROCEDURE — 3074F SYST BP LT 130 MM HG: CPT | Performed by: SURGERY

## 2022-12-01 PROCEDURE — 1101F PT FALLS ASSESS-DOCD LE1/YR: CPT | Performed by: SURGERY

## 2022-12-01 PROCEDURE — G8536 NO DOC ELDER MAL SCRN: HCPCS | Performed by: SURGERY

## 2022-12-01 PROCEDURE — 3079F DIAST BP 80-89 MM HG: CPT | Performed by: SURGERY

## 2022-12-01 PROCEDURE — G8427 DOCREV CUR MEDS BY ELIG CLIN: HCPCS | Performed by: SURGERY

## 2022-12-01 PROCEDURE — G8417 CALC BMI ABV UP PARAM F/U: HCPCS | Performed by: SURGERY

## 2022-12-01 PROCEDURE — 1123F ACP DISCUSS/DSCN MKR DOCD: CPT | Performed by: SURGERY

## 2022-12-01 PROCEDURE — G8752 SYS BP LESS 140: HCPCS | Performed by: SURGERY

## 2022-12-01 PROCEDURE — G8754 DIAS BP LESS 90: HCPCS | Performed by: SURGERY

## 2022-12-01 PROCEDURE — G8510 SCR DEP NEG, NO PLAN REQD: HCPCS | Performed by: SURGERY

## 2022-12-01 RX ORDER — IBUPROFEN 600 MG/1
TABLET ORAL
COMMUNITY

## 2022-12-01 NOTE — PROGRESS NOTES
[address] [place] [address] [facility] History and Physical    Chief Complaint: Polycystic liver disease    History of Present Illness:      Iris Crawford is a 81 y.o. male who was kindly referred by Dr. Marissa Rosa for consideration of hepatic cyst marsupialization. The patient has a diagnosis of polycystic kidney disease and polycystic liver disease. He is a retired OB/GYN and reports a family history of polycystic liver disease in his sister and father. He first identified the cystic lesions in his liver 20 years ago on an ultrasound. More recently he has been having issues with upper abdominal pain, early satiety, decreased appetite and weight loss. His liver function has been stable. His creatinine has been stable for several years around 1.5. He was seen by hematology given his increasing symptoms and a CT scan was obtained. This showed numerous large simple appearing cyst in the liver consistent with polycystic liver disease. No worrisome features for malignancy were noted. A dominant right-sided cyst and two large left-sided cysts were noted that appear to be contributing to his symptoms. Of note, the patient had a previous umbilical hernia repair with mesh. Past Medical History:   Diagnosis Date    Essential hypertension     Hepatic cyst     Reflux Jan 2019?     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 Estiven Bolanos History    Marital status:      Spouse name: Not on file    Number of children: Not on file    Years of education: Not on file    Highest education level: Not on file   Occupational History    Not on file   Tobacco Use    Smoking status: Former     Packs/day: 1.50     Years: 8.00 Pack years: 12.00     Types: Cigarettes     Quit date: 65     Years since quittin.9    Smokeless tobacco: Never    Tobacco comments:     Occasional cigars and pipe use during those years too. Vaping Use    Vaping Use: Never used   Substance and Sexual Activity    Alcohol use: Yes     Alcohol/week: 1.0 - 2.0 standard drink     Types: 1 - 2 Glasses of wine per week     Comment: Occasional wine    Drug use: No    Sexual activity: Yes     Partners: Female     Birth control/protection: Surgical   Other Topics Concern    Not on file   Social History Narrative    Not on file     Social Determinants of Health     Financial Resource Strain: Not on file   Food Insecurity: Not on file   Transportation Needs: Not on file   Physical Activity: Not on file   Stress: Not on file   Social Connections: Not on file   Intimate Partner Violence: Not on file   Housing Stability: Not on file       Family History   Problem Relation Age of Onset    OSTEOARTHRITIS Mother     Diabetes Mother     Hypertension Mother     Cancer Father         Prostate and bladder    Heart Disease Father     Hypertension Father     Cancer Sister     Diabetes Sister     Lung Disease Sister          Current Outpatient Medications:     ibuprofen (MOTRIN) 600 mg tablet, Take  by mouth every six (6) hours as needed for Pain., Disp: , Rfl:     Xiidra 5 % dpet, , Disp: , Rfl:     hydroCHLOROthiazide (HYDRODIURIL) 25 mg tablet, , Disp: , Rfl:     candesartan (ATACAND) 16 mg tablet, Take  by mouth daily. , Disp: , Rfl:     multivitamin (ONE A DAY) tablet, Take 1 Tab by mouth daily. , Disp: , Rfl:     ascorbic acid, vitamin C, (VITAMIN C) 500 mg tablet, Take  by mouth., Disp: , Rfl:     terbinafine HCL (LAMISIL) 250 mg tablet, Take 250 mg by mouth daily.  (Patient not taking: Reported on 2022), Disp: , Rfl:     bupivacaine-EPINEPHrine (MARCAINE-EPINEPHRINE) 0.25 %-1:200,000 soln, Used for mohs surgery  Indications: LOCAL ANESTHESIA FOR PROCEDURES, Disp: 1.5 mL, Rfl: 0    glucosamine-chondroitin (ARTHX) 500-400 mg cap, Take 1 Cap by mouth daily. , Disp: , Rfl:     fish oil-omega-3 fatty acids (FISH OIL) 300-500 mg cap, Take  by mouth., Disp: , Rfl:     aspirin 81 mg chewable tablet, Take 81 mg by mouth daily. , Disp: , Rfl:     POTASSIUM GLUCONATE PO, Take  by mouth., Disp: , Rfl:     No Known Allergies    ROS   Constitutional: Weight loss  Ears, Nose, Mouth, Throat, and Face: Negative  Respiratory: Negative  Cardiovascular: Negative  Gastrointestinal: Abdominal pain, early satiety, decreased appetite. Genitourinary: Negative  Integument/Breast: Negative  Hematologic/Lymphatic: Negative  Behavioral/Psychiatric: Negative  Allergic/Immunologic: Negative      Physical Exam:     Visit Vitals  /82 (BP 1 Location: Right upper arm)   Pulse 63   Temp 97.6 °F (36.4 °C) (Oral)   Resp 19   Ht 6' (1.829 m)   Wt 234 lb 9.6 oz (106.4 kg)   SpO2 95%   BMI 31.82 kg/m²       General - alert and oriented, no apparent distress  HEENT - NC/AT. No scleral icterus  Pulm - CTAB, normal inspiratory effort  CV - RRR, no M/R/G  Abd - soft, NT, ND. The hepatic cysts are easily palpable in the epigastric abdomen and right subcostal regions. This area is mildly tender to palpation. No evidence of abdominal wall hernia. Ext - warm, well perfused, no edema  Lymphatics - no cervical, supraclavicular or inguinal adenopathy noted.    Skin - supple, no rashes  Psychiatric - normal affect, good mood    Labs  Lab Results   Component Value Date/Time    WBC 5.7 11/15/2022 02:11 PM    HGB 12.8 (L) 11/15/2022 02:11 PM    HCT 37.2 (L) 11/15/2022 02:11 PM    PLATELET 378 26/36/5492 02:11 PM    MCV 91 11/15/2022 02:11 PM     Lab Results   Component Value Date/Time    Sodium 139 11/15/2022 02:11 PM    Potassium 4.3 11/15/2022 02:11 PM    Chloride 98 11/15/2022 02:11 PM    CO2 23 11/15/2022 02:11 PM    Glucose 98 11/15/2022 02:11 PM    BUN 26 11/15/2022 02:11 PM    Creatinine 1.43 (H) 11/15/2022 02:11 PM BUN/Creatinine ratio 18 11/15/2022 02:11 PM    Calcium 10.3 (H) 11/15/2022 02:11 PM    Bilirubin, total 0.6 11/15/2022 02:11 PM    Alk. phosphatase 154 (H) 11/15/2022 02:11 PM    Protein, total 7.4 11/15/2022 02:11 PM    Albumin 4.8 11/15/2022 02:11 PM    ALT (SGPT) 24 11/15/2022 02:11 PM    AST (SGOT) 26 11/15/2022 02:11 PM         Imaging  CT Results (most recent):  Results from [place] encounter on 11/17/22    CT ABD W WO CONT    Narrative  EXAM: CT ABD W WO CONT    INDICATION: Liver cysts    COMPARISON: None. IV CONTRAST: 100 mL of Isovue-370. ORAL CONTRAST: None    TECHNIQUE:  Multislice helical CT was performed from the diaphragm to the iliac crest prior  to and during rapid bolus intravenous contrast administration. Contiguous 5 mm  axial images were reconstructed and lung and soft tissue windows were generated. Coronal and sagittal reformations were generated. CT dose reduction was  achieved through use of a standardized protocol tailored for this examination  and automatic exposure control for dose modulation. FINDINGS:    LOWER THORAX: No significant abnormality in the incidentally imaged lower chest.    LIVER: There are multiple nonenhancing mixed density cysts scattered throughout  both lobes of the liver. In addition, there are coarse calcifications in the  right hepatic lobe. No solid liver lesions. BILIARY TREE: Gallbladder is within normal limits. CBD is not dilated. SPLEEN: Calcifications in the spleen. PANCREAS: No mass or ductal dilatation. ADRENALS: Unremarkable. KIDNEYS: Numerous nonenhancing low and intermediate density renal lesions  consistent with simple and hemorrhagic cysts. No hydronephrosis. STOMACH: Unremarkable. SMALL BOWEL: No dilatation or wall thickening. COLON: Scattered colonic diverticula. No colonic mass or wall thickening. APPENDIX: Normal  PERITONEUM: No ascites or pneumoperitoneum. RETROPERITONEUM: No lymphadenopathy or aortic aneurysm.   BONES: Levoconvex scoliosis of the lumbar spine. Endplate degenerative changes. No fracture or aggressive osseous lesion. ABDOMINAL WALL: No mass or hernia. ADDITIONAL COMMENTS: N/A    Impression  1. Polycystic kidney and liver disease. 2.  No solid lesions detected by CT. 3.  No acute pathology in the abdomen. I have reviewed and agree with all of the pertinent images    Assessment:     Alix Hodgkins is a 81 y.o. male with polycystic liver disease and polycystic kidney disease. He is symptomatic from his liver cyst causing abdominal pain, early satiety and diminished appetite. Recommendations:     1. Given the size and location of his hepatic cysts, I do think these are contributing to his symptomatology. I have recommended a robotic hepatic cyst marsupialization of the dominant right-sided cyst as well as the 2 largest left-sided cysts. We discussed that he has numerous additional cysts which would be left alone. We discussed a robotic approach including details of the operation, potential complications and expected recovery. We discussed the potential for cyst recurrence in the future or enlargement of his existing cysts. He is in agreement to proceed. 60 mins of time was spent with the patient of which > 50% of the time involved face-to-face counseling of the patient regarding the proposed treatment plan.       Nicky Vargas MD  12/1/2022    CC: MD Dr. Vanessa Moore

## 2022-12-01 NOTE — LETTER
12/1/2022    Patient: Herberth Jain   YOB: 1950   Date of Visit: 12/1/2022     Larry Chandler MD  [facility] [address] [address]  Via Fax: [address] [facility], [address] [facility] [address]  Via In [facility] [address]    Dear Yevonne Evans, MD Retta Hatchet, MD,      Thank you for referring Mr. Zachariah Bustamante to [address] for evaluation. My notes for this consultation are attached. If you have questions, please do not hesitate to call me. I look forward to following your patient along with you.       Sincerely,    Opal Yan MD

## 2022-12-01 NOTE — PROGRESS NOTES
Identified pt with two pt identifiers (name and ). Reviewed chart in preparation for visit and have obtained necessary documentation.  Slider is a 81 y.o. male  Chief Complaint   Patient presents with    New Patient     Referred by Dr. Gloria Gongora. Eval Liver Cyst      Visit Vitals  /82 (BP 1 Location: Right upper arm)   Pulse 63   Temp 97.6 °F (36.4 °C) (Oral)   Resp 19   Ht 6' (1.829 m)   Wt 234 lb 9.6 oz (106.4 kg)   SpO2 95%   BMI 31.82 kg/m²       1. Have you been to the ER, urgent care clinic since your last visit? Hospitalized since your last visit? No    2. Have you seen or consulted any other health care providers outside of the [address] [place] [address] since your last visit? Include any pap smears or colon screening.  No

## 2022-12-16 ENCOUNTER — TELEPHONE (OUTPATIENT)
Dept: SURGERY | Age: 72
End: 2022-12-16

## 2022-12-16 NOTE — TELEPHONE ENCOUNTER
Patient returned missed call. Would like a call back on his home phone first and if he doesn't answer to call his mobile.

## 2022-12-16 NOTE — TELEPHONE ENCOUNTER
Patient called and stated that he tested positive for covid. He states that symptoms broke two days ago and pre-admission testing told him that it was within the 21 day window. He is scheduled for surgery on January 6th. He states that he wants to know if Dr. Charlette Garrison is comfortable going forward with surgery then or if he needs to reschedule.

## 2022-12-19 NOTE — TELEPHONE ENCOUNTER
Returned call to patient. Two patient identifiers used. Explained to pt per Dr. Angel Luis Orosco he is good for surgery. Pt expressed understanding.

## 2022-12-23 ENCOUNTER — APPOINTMENT (OUTPATIENT)
Dept: CT IMAGING | Age: 72
End: 2022-12-23
Attending: STUDENT IN AN ORGANIZED HEALTH CARE EDUCATION/TRAINING PROGRAM
Payer: MEDICARE

## 2022-12-23 ENCOUNTER — HOSPITAL ENCOUNTER (EMERGENCY)
Age: 72
Discharge: HOME | End: 2022-12-23
Attending: STUDENT IN AN ORGANIZED HEALTH CARE EDUCATION/TRAINING PROGRAM | Admitting: STUDENT IN AN ORGANIZED HEALTH CARE EDUCATION/TRAINING PROGRAM
Payer: MEDICARE

## 2022-12-23 ENCOUNTER — TELEPHONE (OUTPATIENT)
Dept: SURGERY | Age: 72
End: 2022-12-23

## 2022-12-23 VITALS
BODY MASS INDEX: 31.69 KG/M2 | DIASTOLIC BLOOD PRESSURE: 89 MMHG | TEMPERATURE: 98.5 F | WEIGHT: 234 LBS | RESPIRATION RATE: 20 BRPM | SYSTOLIC BLOOD PRESSURE: 145 MMHG | HEIGHT: 72 IN | HEART RATE: 76 BPM | OXYGEN SATURATION: 100 %

## 2022-12-23 DIAGNOSIS — K76.89 LIVER CYST: Primary | ICD-10-CM

## 2022-12-23 DIAGNOSIS — K76.89 HEPATIC CYST: Primary | ICD-10-CM

## 2022-12-23 LAB
ALBUMIN SERPL-MCNC: 4 G/DL (ref 3.5–5)
ALBUMIN/GLOB SERPL: 1 {RATIO} (ref 1.1–2.2)
ALP SERPL-CCNC: 135 U/L (ref 45–117)
ALT SERPL-CCNC: 30 U/L (ref 12–78)
ANION GAP SERPL CALC-SCNC: 8 MMOL/L (ref 5–15)
AST SERPL W P-5'-P-CCNC: 20 U/L (ref 15–37)
BASOPHILS # BLD: 0 K/UL (ref 0–0.1)
BASOPHILS NFR BLD: 0 % (ref 0–1)
BILIRUB SERPL-MCNC: 0.7 MG/DL (ref 0.2–1)
BUN SERPL-MCNC: 27 MG/DL (ref 6–20)
BUN/CREAT SERPL: 17 (ref 12–20)
CA-I BLD-MCNC: 10 MG/DL (ref 8.5–10.1)
CHLORIDE SERPL-SCNC: 99 MMOL/L (ref 97–108)
CO2 SERPL-SCNC: 31 MMOL/L (ref 21–32)
CREAT SERPL-MCNC: 1.56 MG/DL (ref 0.7–1.3)
DIFFERENTIAL METHOD BLD: ABNORMAL
EOSINOPHIL # BLD: 0.1 K/UL (ref 0–0.4)
EOSINOPHIL NFR BLD: 1 % (ref 0–7)
ERYTHROCYTE [DISTWIDTH] IN BLOOD BY AUTOMATED COUNT: 13 % (ref 11.5–14.5)
GLOBULIN SER CALC-MCNC: 4.1 G/DL (ref 2–4)
GLUCOSE SERPL-MCNC: 110 MG/DL (ref 65–100)
HCT VFR BLD AUTO: 38 % (ref 36.6–50.3)
HGB BLD-MCNC: 12.7 G/DL (ref 12.1–17)
IMM GRANULOCYTES # BLD AUTO: 0 K/UL (ref 0–0.04)
IMM GRANULOCYTES NFR BLD AUTO: 0 % (ref 0–0.5)
LYMPHOCYTES # BLD: 1 K/UL (ref 0.8–3.5)
LYMPHOCYTES NFR BLD: 11 % (ref 12–49)
MCH RBC QN AUTO: 31.2 PG (ref 26–34)
MCHC RBC AUTO-ENTMCNC: 33.4 G/DL (ref 30–36.5)
MCV RBC AUTO: 93.4 FL (ref 80–99)
MONOCYTES # BLD: 0.8 K/UL (ref 0–1)
MONOCYTES NFR BLD: 9 % (ref 5–13)
NEUTS SEG # BLD: 7.2 K/UL (ref 1.8–8)
NEUTS SEG NFR BLD: 79 % (ref 32–75)
PLATELET # BLD AUTO: 223 K/UL (ref 150–400)
PMV BLD AUTO: 10.7 FL (ref 8.9–12.9)
POTASSIUM SERPL-SCNC: 4 MMOL/L (ref 3.5–5.1)
PROT SERPL-MCNC: 8.1 G/DL (ref 6.4–8.2)
RBC # BLD AUTO: 4.07 M/UL (ref 4.1–5.7)
SODIUM SERPL-SCNC: 138 MMOL/L (ref 136–145)
WBC # BLD AUTO: 9.2 K/UL (ref 4.1–11.1)

## 2022-12-23 PROCEDURE — 80053 COMPREHEN METABOLIC PANEL: CPT

## 2022-12-23 PROCEDURE — 74011250637 HC RX REV CODE- 250/637: Performed by: STUDENT IN AN ORGANIZED HEALTH CARE EDUCATION/TRAINING PROGRAM

## 2022-12-23 PROCEDURE — 74177 CT ABD & PELVIS W/CONTRAST: CPT

## 2022-12-23 PROCEDURE — 99285 EMERGENCY DEPT VISIT HI MDM: CPT

## 2022-12-23 PROCEDURE — 74011000636 HC RX REV CODE- 636: Performed by: STUDENT IN AN ORGANIZED HEALTH CARE EDUCATION/TRAINING PROGRAM

## 2022-12-23 PROCEDURE — 85025 COMPLETE CBC W/AUTO DIFF WBC: CPT

## 2022-12-23 RX ORDER — ACETAMINOPHEN 325 MG/1
650 TABLET ORAL
Status: COMPLETED | OUTPATIENT
Start: 2022-12-23 | End: 2022-12-23

## 2022-12-23 RX ADMIN — ACETAMINOPHEN 650 MG: 325 TABLET, FILM COATED ORAL at 15:27

## 2022-12-23 RX ADMIN — IOPAMIDOL 100 ML: 755 INJECTION, SOLUTION INTRAVENOUS at 16:22

## 2022-12-23 NOTE — TELEPHONE ENCOUNTER
Returned call to patient. Two patient identifiers used. Pt stated he was having sharp pain under his diaphragm when he coughs or sneezes. Pt stated he isn't SOB, but can't take deep breaths w/o pain. Pt further stated he had shoulder pain yesterday that has since gone away. Pt thinks one of the \"cysts may have bust a little\". Pt calling to see if Dr. Alethea Mccormack thinks he should get an [address]. Explained to patient that Dr. Alethea Mccormack is out of the office until next week, however I would speak to the surgeon that is covering for him and see what he suggests and I will return the call. Pt stated if an US is needed he would like to have it done in [place]. Perfect serve message sent to Dr. Cole Wahl.

## 2022-12-23 NOTE — TELEPHONE ENCOUNTER
Please call pt back. Pt stated that his surgery with Dr Linden Tay is Jan 6th and is having some discomfort. Pt would like to speak with nurse.

## 2022-12-23 NOTE — TELEPHONE ENCOUNTER
Returned call to patient informed him ordered placed for CT per Dr. Jenelle Murphy. Patient asked if he would like me to schedule or did he want to call to confirm location and time best for him. Patient states he will call himself he is looking to possibly use [facility].  Patient instructed to return call to office If any further information is needed.

## 2022-12-23 NOTE — DISCHARGE INSTRUCTIONS
Thank you! Thank you for allowing me to care for you in the emergency department. It is my goal to provide you with excellent care. If you have not received excellent quality care, please ask to speak to the nurse manager. Please fill out the survey that will come to you by mail or email since we listen to your feedback! Below you will find a list of your tests from today's visit. Should you have any questions, please do not hesitate to call the emergency department. Labs  Recent Results (from the past 12 hour(s))   CBC WITH AUTOMATED DIFF    Collection Time: 12/23/22  3:21 PM   Result Value Ref Range    WBC 9.2 4.1 - 11.1 K/uL    RBC 4.07 (L) 4.10 - 5.70 M/uL    HGB 12.7 12.1 - 17.0 g/dL    HCT 38.0 36.6 - 50.3 %    MCV 93.4 80.0 - 99.0 FL    MCH 31.2 26.0 - 34.0 PG    MCHC 33.4 30.0 - 36.5 g/dL    RDW 13.0 11.5 - 14.5 %    PLATELET 512 582 - 830 K/uL    MPV 10.7 8.9 - 12.9 FL    NEUTROPHILS 79 (H) 32 - 75 %    LYMPHOCYTES 11 (L) 12 - 49 %    MONOCYTES 9 5 - 13 %    EOSINOPHILS 1 0 - 7 %    BASOPHILS 0 0 - 1 %    IMMATURE GRANULOCYTES 0 0.0 - 0.5 %    ABS. NEUTROPHILS 7.2 1.8 - 8.0 K/UL    ABS. LYMPHOCYTES 1.0 0.8 - 3.5 K/UL    ABS. MONOCYTES 0.8 0.0 - 1.0 K/UL    ABS. EOSINOPHILS 0.1 0.0 - 0.4 K/UL    ABS. BASOPHILS 0.0 0.0 - 0.1 K/UL    ABS. IMM. GRANS. 0.0 0.00 - 0.04 K/UL    DF AUTOMATED     METABOLIC PANEL, COMPREHENSIVE    Collection Time: 12/23/22  3:21 PM   Result Value Ref Range    Sodium 138 136 - 145 mmol/L    Potassium 4.0 3.5 - 5.1 mmol/L    Chloride 99 97 - 108 mmol/L    CO2 31 21 - 32 mmol/L    Anion gap 8 5 - 15 mmol/L    Glucose 110 (H) 65 - 100 mg/dL    BUN 27 (H) 6 - 20 mg/dL    Creatinine 1.56 (H) 0.70 - 1.30 mg/dL    BUN/Creatinine ratio 17 12 - 20      eGFR 47 (L) >60 ml/min/1.73m2    Calcium 10.0 8.5 - 10.1 mg/dL    Bilirubin, total 0.7 0.2 - 1.0 mg/dL    AST (SGOT) 20 15 - 37 U/L    ALT (SGPT) 30 12 - 78 U/L    Alk.  phosphatase 135 (H) 45 - 117 U/L    Protein, total 8.1 6.4 - 8.2 g/dL    Albumin 4.0 3.5 - 5.0 g/dL    Globulin 4.1 (H) 2.0 - 4.0 g/dL    A-G Ratio 1.0 (L) 1.1 - 2.2         Radiologic Studies  CT ABD PELV W CONT   Final Result   1. Polycystic kidney and liver disease, similar to the prior study. 2. The liver is enlarged, as the result of these multiple cysts; there is right   hemidiaphragm elevation, there is a trace right pleural effusion, and right   middle and lower lobe atelectasis. 3. No significant change or acute abnormality. 4. Diverticulosis of the colon. No acute diverticulitis. CT Results  (Last 48 hours)                 12/23/22 1621  CT ABD PELV W CONT Final result    Impression:  1. Polycystic kidney and liver disease, similar to the prior study. 2. The liver is enlarged, as the result of these multiple cysts; there is right   hemidiaphragm elevation, there is a trace right pleural effusion, and right   middle and lower lobe atelectasis. 3. No significant change or acute abnormality. 4. Diverticulosis of the colon. No acute diverticulitis. Narrative:  INDICATION: Eval status of liver cysts; right-sided pain       COMPARISON: CT November 2022       TECHNIQUE: Multiphasic CT of the abdomen and pelvis was performed. Arterial,   venous, and delayed phase imaging was performed. Following the uneventful   intravenous administration of 100 cc Isovue-370, thin axial images were obtained   through the abdomen and pelvis. Coronal and sagittal reconstructions were   generated. Oral contrast was not administered. CT dose reduction was achieved   through use of a standardized protocol tailored for this examination and   automatic exposure control for dose modulation. FINDINGS:        LUNG BASES: Trace right pleural effusion.  Chronic elevation of the right   hemidiaphragm, with right middle and lower lobe atelectasis   INCIDENTALLY IMAGED HEART AND MEDIASTINUM: Cardiomegaly with mild calcific   coronary artery atherosclerosis   LIVER: Again demonstrated are innumerable liver cysts. No enhancing solid lesion   is identified. Overall, the findings are similar to the prior study, with the   largest cyst replacing much of the right lobe, measuring 15.7 x 14.4 cm in   greatest axial dimension, and 14.3 cm in greatest craniocaudal dimension. Estil Boyce GALLBLADDER: Unremarkable. SPLEEN: Calcified splenic granulomas are noted   PANCREAS: No mass or duct dilation. ADRENALS: Unremarkable. KIDNEYS: Numerous bilateral renal cysts are noted, consistent with polycystic   kidney disease. No solid enhancing mass is identified. No hydronephrosis. STOMACH: Unremarkable. SMALL BOWEL: No dilatation or wall thickening. COLON: Diverticulosis of the colon, with no evidence of acute diverticulitis. APPENDIX: Unremarkable. PERITONEUM: No ascites or pneumoperitoneum. RETROPERITONEUM: No lymphadenopathy or aortic aneurysm. REPRODUCTIVE ORGANS: The prostate is noted. It is mildly enlarged. URINARY BLADDER: No mass or calculus. BONES: No acute fracture or aggressive lesion. ADDITIONAL COMMENTS: Degenerative disc disease throughout the lumbar spine. Levoscoliosis is noted. CXR Results  (Last 48 hours)      None          ------------------------------------------------------------------------------------------------------------  The exam and treatment you received in the Emergency Department were for an urgent problem and are not intended as complete care. It is important that you follow-up with a doctor, nurse practitioner, or physician assistant to:  (1) confirm your diagnosis,  (2) re-evaluation of changes in your illness and treatment, and  (3) for ongoing care. Please take your discharge instructions with you when you go to your follow-up appointment. If you have any problem arranging a follow-up appointment, contact the Emergency Department.   If your symptoms become worse or you do not improve as expected and you are unable to reach your health care provider, please return to the Emergency Department. We are available 24 hours a day. If a prescription has been provided, please have it filled as soon as possible to prevent a delay in treatment. If you have any questions or reservations about taking the medication due to side effects or interactions with other medications, please call your primary care provider or contact the ER.

## 2022-12-23 NOTE — ED TRIAGE NOTES
PT. TO E WITH C/O ABDOMINAL PAIN FOR ABOUT 2 DAYS. PT. HAS HISTORY OF CONGENTIAL LIVER / KIDNEY CYSTS. PT. SCHEDULED FOR SURGERY 1/06/2023. PT. NEEDS CT TODAY BECAUSE OF INCREASE PAIN RADIATING TO RIGHT SHOULDER.

## 2022-12-23 NOTE — ED PROVIDER NOTES
EMERGENCY DEPARTMENT HISTORY AND PHYSICAL EXAM      Date: 12/23/2022  Patient Name: Shantelle Jarrett    History of Presenting Illness     Chief Complaint   Patient presents with    Abdominal Pain       History Provided By: Patient    HPI: Shantelle Jarrett, 81 y.o. male with past medical history as reviewed below and notable for hereditary hepatic and renal cysts disorder presents for evaluation of abdominal pain. Patient has multiple known large hepatic cysts and reports sharp pain in his right upper quadrant starting yesterday which is concerning to him as he thinks it may be related to the cysts and possible cyst rupture. He is scheduled for surgery with his surgical oncologist, Dr. Andi Galloway, for robotic hepatic cyst marsupialization on 1/6. He spoke with Dr. Sukh Bird office today and they ordered a CT scan for him due to his developing new sharp abdominal pain. Pain is constant, moderate to severe in nature. He has been using Tylenol as needed at home with minimal relief. He denies any associated nausea or vomiting. No other abdominal pain. No change in his bowel movements. Slight decrease in his p.o. intake due to his pain. There are no other complaints, changes, or physical findings at this time. Past History     Past Medical History:  Past Medical History:   Diagnosis Date    Essential hypertension     Hepatic cyst     Reflux Jan 2019?     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 Ar Rodriguez       Family History:  Family History   Problem Relation Age of Onset    OSTEOARTHRITIS Mother     Diabetes Mother     Hypertension Mother     Cancer Father         Prostate and bladder    Heart Disease Father     Hypertension Father Cancer Sister     Diabetes Sister     Lung Disease Sister        Social History:  Social History     Tobacco Use    Smoking status: Former     Packs/day: 1.50     Years: 8.00     Pack years: 12.00     Types: Cigarettes     Quit date:      Years since quittin.0    Smokeless tobacco: Never    Tobacco comments:     Occasional cigars and pipe use during those years too. Vaping Use    Vaping Use: Never used   Substance Use Topics    Alcohol use: Yes     Alcohol/week: 1.0 - 2.0 standard drink     Types: 1 - 2 Glasses of wine per week     Comment: Occasional wine    Drug use: No       Allergies:  No Known Allergies    PCP: Jose L Morataya MD    No current facility-administered medications on file prior to encounter. Current Outpatient Medications on File Prior to Encounter   Medication Sig Dispense Refill    ibuprofen (MOTRIN) 600 mg tablet Take  by mouth every six (6) hours as needed for Pain. Xiidra 5 % dpet       hydroCHLOROthiazide (HYDRODIURIL) 25 mg tablet       candesartan (ATACAND) 16 mg tablet Take  by mouth daily. multivitamin (ONE A DAY) tablet Take 1 Tab by mouth daily. ascorbic acid, vitamin C, (VITAMIN C) 500 mg tablet Take  by mouth. Review of Systems   Review of Systems   Constitutional:  Negative for fever. HENT:  Negative for congestion. Respiratory:  Negative for cough and shortness of breath. Cardiovascular:  Negative for chest pain. Gastrointestinal:  Positive for abdominal distention and abdominal pain. Negative for constipation, nausea and vomiting. Genitourinary:  Negative for dysuria. Musculoskeletal:  Negative for arthralgias and myalgias. Skin:  Negative for rash. Allergic/Immunologic: Negative for immunocompromised state. Neurological:  Negative for syncope. Psychiatric/Behavioral:  Negative for confusion. Physical Exam   Physical Exam  Vitals reviewed. Constitutional:       General: He is not in acute distress.      Appearance: He is not toxic-appearing. HENT:      Head: Normocephalic and atraumatic. Eyes:      Extraocular Movements: Extraocular movements intact. Pupils: Pupils are equal, round, and reactive to light. Cardiovascular:      Rate and Rhythm: Normal rate and regular rhythm. Heart sounds: Normal heart sounds. Pulmonary:      Effort: Pulmonary effort is normal.      Breath sounds: Normal breath sounds. Abdominal:      Palpations: Abdomen is soft. Tenderness: There is abdominal tenderness in the right upper quadrant. Musculoskeletal:      Right lower leg: No edema. Left lower leg: No edema. Skin:     General: Skin is warm and dry. Neurological:      General: No focal deficit present. Mental Status: He is alert. Psychiatric:         Mood and Affect: Mood normal.         Behavior: Behavior normal.       Lab and Diagnostic Study Results   Labs -     Recent Results (from the past 12 hour(s))   CBC WITH AUTOMATED DIFF    Collection Time: 12/23/22  3:21 PM   Result Value Ref Range    WBC 9.2 4.1 - 11.1 K/uL    RBC 4.07 (L) 4.10 - 5.70 M/uL    HGB 12.7 12.1 - 17.0 g/dL    HCT 38.0 36.6 - 50.3 %    MCV 93.4 80.0 - 99.0 FL    MCH 31.2 26.0 - 34.0 PG    MCHC 33.4 30.0 - 36.5 g/dL    RDW 13.0 11.5 - 14.5 %    PLATELET 446 079 - 496 K/uL    MPV 10.7 8.9 - 12.9 FL    NEUTROPHILS 79 (H) 32 - 75 %    LYMPHOCYTES 11 (L) 12 - 49 %    MONOCYTES 9 5 - 13 %    EOSINOPHILS 1 0 - 7 %    BASOPHILS 0 0 - 1 %    IMMATURE GRANULOCYTES 0 0.0 - 0.5 %    ABS. NEUTROPHILS 7.2 1.8 - 8.0 K/UL    ABS. LYMPHOCYTES 1.0 0.8 - 3.5 K/UL    ABS. MONOCYTES 0.8 0.0 - 1.0 K/UL    ABS. EOSINOPHILS 0.1 0.0 - 0.4 K/UL    ABS. BASOPHILS 0.0 0.0 - 0.1 K/UL    ABS. IMM.  GRANS. 0.0 0.00 - 0.04 K/UL    DF AUTOMATED     METABOLIC PANEL, COMPREHENSIVE    Collection Time: 12/23/22  3:21 PM   Result Value Ref Range    Sodium 138 136 - 145 mmol/L    Potassium 4.0 3.5 - 5.1 mmol/L    Chloride 99 97 - 108 mmol/L    CO2 31 21 - 32 mmol/L    Anion gap 8 5 - 15 mmol/L    Glucose 110 (H) 65 - 100 mg/dL    BUN 27 (H) 6 - 20 mg/dL    Creatinine 1.56 (H) 0.70 - 1.30 mg/dL    BUN/Creatinine ratio 17 12 - 20      eGFR 47 (L) >60 ml/min/1.73m2    Calcium 10.0 8.5 - 10.1 mg/dL    Bilirubin, total 0.7 0.2 - 1.0 mg/dL    AST (SGOT) 20 15 - 37 U/L    ALT (SGPT) 30 12 - 78 U/L    Alk. phosphatase 135 (H) 45 - 117 U/L    Protein, total 8.1 6.4 - 8.2 g/dL    Albumin 4.0 3.5 - 5.0 g/dL    Globulin 4.1 (H) 2.0 - 4.0 g/dL    A-G Ratio 1.0 (L) 1.1 - 2.2         Radiologic Studies -   @lastxrresult@  CT Results  (Last 48 hours)                 12/23/22 1621  CT ABD PELV W CONT Final result    Impression:  1. Polycystic kidney and liver disease, similar to the prior study. 2. The liver is enlarged, as the result of these multiple cysts; there is right   hemidiaphragm elevation, there is a trace right pleural effusion, and right   middle and lower lobe atelectasis. 3. No significant change or acute abnormality. 4. Diverticulosis of the colon. No acute diverticulitis. Narrative:  INDICATION: Eval status of liver cysts; right-sided pain       COMPARISON: CT November 2022       TECHNIQUE: Multiphasic CT of the abdomen and pelvis was performed. Arterial,   venous, and delayed phase imaging was performed. Following the uneventful   intravenous administration of 100 cc Isovue-370, thin axial images were obtained   through the abdomen and pelvis. Coronal and sagittal reconstructions were   generated. Oral contrast was not administered. CT dose reduction was achieved   through use of a standardized protocol tailored for this examination and   automatic exposure control for dose modulation. FINDINGS:        LUNG BASES: Trace right pleural effusion.  Chronic elevation of the right   hemidiaphragm, with right middle and lower lobe atelectasis   INCIDENTALLY IMAGED HEART AND MEDIASTINUM: Cardiomegaly with mild calcific   coronary artery atherosclerosis   LIVER: Again demonstrated are innumerable liver cysts. No enhancing solid lesion   is identified. Overall, the findings are similar to the prior study, with the   largest cyst replacing much of the right lobe, measuring 15.7 x 14.4 cm in   greatest axial dimension, and 14.3 cm in greatest craniocaudal dimension. [place] GALLBLADDER: Unremarkable. SPLEEN: Calcified splenic granulomas are noted   PANCREAS: No mass or duct dilation. ADRENALS: Unremarkable. KIDNEYS: Numerous bilateral renal cysts are noted, consistent with polycystic   kidney disease. No solid enhancing mass is identified. No hydronephrosis. STOMACH: Unremarkable. SMALL BOWEL: No dilatation or wall thickening. COLON: Diverticulosis of the colon, with no evidence of acute diverticulitis. APPENDIX: Unremarkable. PERITONEUM: No ascites or pneumoperitoneum. RETROPERITONEUM: No lymphadenopathy or aortic aneurysm. REPRODUCTIVE ORGANS: The prostate is noted. It is mildly enlarged. URINARY BLADDER: No mass or calculus. BONES: No acute fracture or aggressive lesion. ADDITIONAL COMMENTS: Degenerative disc disease throughout the lumbar spine. Levoscoliosis is noted. CXR Results  (Last 48 hours)      None            Medical Decision Making and ED Course   Differential Diagnosis & Medical Decision Making Provider Note:   21-year-old male presents for evaluation of abdominal pain. He was unable to receive his scheduled CT scan today due to electronic error and opts to be seen in the emergency department for his complaint. He is tender in the right upper quadrant. Will perform CT scan given the differential includes hepatic pathology related to his known hepatic cysts. - I am the first provider for this patient. I reviewed the vital signs, available nursing notes, past medical history, past surgical history, family history and social history.  The patients presenting problems have been discussed, and they are in agreement with the care plan formulated and outlined with them. I have encouraged them to ask questions as they arise throughout their visit. Vital Signs-Reviewed the patient's vital signs. Patient Vitals for the past 12 hrs:   Temp Pulse Resp BP SpO2   12/23/22 1442 98.5 °F (36.9 °C) 76 20 (!) 145/89 100 %       ED Course:   ED Course as of 12/23/22 1713   Fri Dec 23, 2022   1713 CT scan unchanged from prior. Discussed results with patient who will follow up with his oncologist. [BQ]      ED Course User Index  [BQ] Darlyn Shelley MD         Procedures   Performed by: Brittnee Fernandez MD  Procedures      Disposition   Disposition: DC- Adult Discharges: All of the diagnostic tests were reviewed and questions answered. Diagnosis, care plan and treatment options were discussed. The patient understands the instructions and will follow up as directed. The patients results have been reviewed with them. They have been counseled regarding their diagnosis. The patient verbally convey understanding and agreement of the signs, symptoms, diagnosis, treatment and prognosis and additionally agrees to follow up as recommended with their PCP in 24 - 48 hours. They also agree with the care-plan and convey that all of their questions have been answered. I have also put together some discharge instructions for them that include: 1) educational information regarding their diagnosis, 2) how to care for their diagnosis at home, as well a 3) list of reasons why they would want to return to the ED prior to their follow-up appointment, should their condition change. DISCHARGE PLAN:  1. Current Discharge Medication List        CONTINUE these medications which have NOT CHANGED    Details   ibuprofen (MOTRIN) 600 mg tablet Take  by mouth every six (6) hours as needed for Pain. Xiidra 5 % dpet       hydroCHLOROthiazide (HYDRODIURIL) 25 mg tablet       candesartan (ATACAND) 16 mg tablet Take  by mouth daily.       multivitamin (ONE A DAY) tablet Take 1 Tab by mouth daily. ascorbic acid, vitamin C, (VITAMIN C) 500 mg tablet Take  by mouth. 2.   Follow-up Information       Follow up With Specialties Details Why Contact Info    [facility] Emergency Medicine  As needed, If symptoms worsen [address] [place] [id number] [phone]          3. Return to ED if worse   4. Current Discharge Medication List          Diagnosis/Clinical Impression     Clinical Impression:   1. Hepatic cyst        Attestations: Malvin Estrada MD, am the primary clinician of record. Please note that this dictation was completed with "[employer] the [employer] voice recognition software. Quite often unanticipated grammatical, syntax, homophones, and other interpretive errors are inadvertently transcribed by the computer software. Please disregard these errors. Please excuse any errors that have escaped final proofreading. Thank you.

## 2022-12-29 ENCOUNTER — TELEPHONE (OUTPATIENT)
Dept: SURGERY | Age: 72
End: 2022-12-29

## 2022-12-29 NOTE — TELEPHONE ENCOUNTER
Two patient identifiers used. Spoke to patient and made him aware that per my conversation with Dr. Tripp Gallego there weren't any significant findings on the CT. Pt stated he is doing ok, but still has some pain under his diaphragm when taking a deep breath. Explained to patient if he is having difficulty or symptoms increase he should go to ED and I would make Dr. Tripp Gallego aware. Pt expressed understanding.

## 2022-12-30 ENCOUNTER — HOSPITAL ENCOUNTER (OUTPATIENT)
Dept: GENERAL RADIOLOGY | Age: 72
Discharge: HOME | End: 2022-12-30
Attending: SURGERY
Payer: MEDICARE

## 2022-12-30 ENCOUNTER — TELEPHONE (OUTPATIENT)
Dept: SURGERY | Age: 72
End: 2022-12-30

## 2022-12-30 ENCOUNTER — HOSPITAL ENCOUNTER (OUTPATIENT)
Dept: PREADMISSION TESTING | Age: 72
Discharge: HOME | End: 2022-12-30
Payer: MEDICARE

## 2022-12-30 VITALS
HEIGHT: 72 IN | SYSTOLIC BLOOD PRESSURE: 146 MMHG | TEMPERATURE: 98.2 F | WEIGHT: 229 LBS | HEART RATE: 73 BPM | DIASTOLIC BLOOD PRESSURE: 84 MMHG | BODY MASS INDEX: 31.02 KG/M2

## 2022-12-30 LAB
ABO + RH BLD: NORMAL
APTT PPP: 29.3 SEC (ref 22.1–31)
ATRIAL RATE: 59 BPM
BLOOD GROUP ANTIBODIES SERPL: NORMAL
CALCULATED P AXIS, ECG09: 8 DEGREES
CALCULATED R AXIS, ECG10: -62 DEGREES
CALCULATED T AXIS, ECG11: -4 DEGREES
DIAGNOSIS, 93000: NORMAL
INR PPP: 1.1 (ref 0.9–1.1)
P-R INTERVAL, ECG05: 158 MS
PROTHROMBIN TIME: 11.1 SEC (ref 9–11.1)
Q-T INTERVAL, ECG07: 434 MS
QRS DURATION, ECG06: 106 MS
QTC CALCULATION (BEZET), ECG08: 429 MS
SPECIMEN EXP DATE BLD: NORMAL
THERAPEUTIC RANGE,PTTT: NORMAL SECS (ref 58–77)
VENTRICULAR RATE, ECG03: 59 BPM

## 2022-12-30 PROCEDURE — 85730 THROMBOPLASTIN TIME PARTIAL: CPT

## 2022-12-30 PROCEDURE — 93005 ELECTROCARDIOGRAM TRACING: CPT

## 2022-12-30 PROCEDURE — 86900 BLOOD TYPING SEROLOGIC ABO: CPT

## 2022-12-30 PROCEDURE — 36415 COLL VENOUS BLD VENIPUNCTURE: CPT

## 2022-12-30 PROCEDURE — 85610 PROTHROMBIN TIME: CPT

## 2022-12-30 PROCEDURE — 71046 X-RAY EXAM CHEST 2 VIEWS: CPT

## 2022-12-30 RX ORDER — FAMOTIDINE 20 MG/1
20 TABLET, FILM COATED ORAL
COMMUNITY

## 2022-12-30 RX ORDER — ACETAMINOPHEN 500 MG
500 TABLET ORAL 2 TIMES DAILY
COMMUNITY

## 2022-12-30 RX ORDER — DOXYCYCLINE 100 MG/1
100 CAPSULE ORAL 2 TIMES DAILY
COMMUNITY

## 2022-12-30 NOTE — PERIOP NOTES
[address] INSTRUCTIONS    Surgery Date:   1/6/23     Your surgeon's office or [facility] staff will call you between 4 PM- 8 PM the day before surgery with your arrival time. If your surgery is on a Monday, you will receive a call the preceding Friday. Please report to [address] Patient Access/Admitting on the 1st floor. Bring your insurance card, photo identification, and any copayment ( if applicable). If you are going home the same day of your surgery, you must have a responsible adult to drive you home. You need to have a responsible adult to stay with you the first 24 hours after surgery and you should not drive a car for 24 hours following your surgery. Nothing to eat or drink after midnight the night before surgery. This includes no water, gum, mints, coffee, juice, etc.  Please note special instructions, if applicable, below for medications. Do NOT drink alcohol or smoke 24 hours before surgery. STOP smoking for 14 days prior as it helps with breathing and healing after surgery. If you are being admitted to the hospital, please leave personal belongings/luggage in your car until you have an assigned hospital room number. Please wear comfortable clothes. Wear your glasses instead of contacts. We ask that all money, jewelry and valuables be left at home. Wear no make up, particularly mascara, the day of surgery. All body piercings, rings, and jewelry need to be removed and left at home. Please remove any nail polish or artificial nails from your fingernails. Please wear your hair loose or down. Please no pony-tails, buns, or any metal hair accessories. If you shower the morning of surgery, please do not apply any lotions or powders afterwards. You may wear deodorant, unless having breast surgery. Do not shave any body area within 24 hours of your surgery. Please follow all instructions to avoid any potential surgical cancellation.   Should your physical condition change, (i.e. fever, cold, flu, etc.) please notify your surgeon as soon as possible. It is important to be on time. If a situation occurs where you may be delayed, please call:  [phone] / [phone] on the day of surgery. The Preadmission Testing staff can be reached at [phone]. Special instructions: ANY INSTRUCTIONS PER DR. HUYNH       Current Outpatient Medications   Medication Sig    OTHER  MG PO DAILY    doxycycline (MONODOX) 100 mg capsule Take 100 mg by mouth two (2) times a day. STARTED ON 12/27    famotidine (Pepcid AC) 20 mg tablet Take 20 mg by mouth Daily (before dinner). BEFORE DINNER    acetaminophen (Tylenol Extra Strength) 500 mg tablet Take 500 mg by mouth two (2) times a day. ibuprofen (MOTRIN) 600 mg tablet Take  by mouth every six (6) hours as needed for Pain. candesartan (ATACAND) 16 mg tablet Take  by mouth daily. multivitamin (ONE A DAY) tablet Take 1 Tab by mouth daily. hydroCHLOROthiazide (HYDRODIURIL) 25 mg tablet Take 25 mg by mouth daily. ascorbic acid, vitamin C, (VITAMIN C) 500 mg tablet Take 500 mg by mouth two (2) times a day. No current facility-administered medications for this encounter. YOU MUST ONLY TAKE THESE MEDICATIONS THE MORNING OF SURGERY WITH A SIP OF WATER: NONE  MEDICATIONS TO TAKE THE MORNING OF SURGERY ONLY IF NEEDED: EXTRA STRENGTH TYLENOL   HOLD these prescription medications BEFORE Surgery: SKIP ATACAND AND HCTZ THE MORNING OF SURGERY   Ask your surgeon/prescribing physician about when/if to STOP taking these medications: ANY YOU HAVE QUESTIONS ON. AND ON IBUPROPHEN   PER ANESTHESIA PROTOCOL, Stop all vitamins, herbal medicines and Aspirin containing products 7 days prior to surgery. Stop any non-steroidal anti-inflammatory drugs (i.e. Ibuprofen, Naproxen, Advil, Aleve) 3 days before surgery. You may take Tylenol.     If you are currently taking Plavix, Coumadin, or any other blood-thinning/anticoagulant medication contact your prescribing physician for instructions. Preventing Infections Before and After - Your Surgery    IMPORTANT INSTRUCTIONS      You play an important role in your health and preparation for surgery. To reduce the germs on your skin you will need to shower with CHG soap (Chorhexidine gluconate 4%) two times before surgery. CHG soap (Hibiclens, Hex-A-Clens or store brand)  CHG soap will be provided at your Preadmission Testing (PAT) appointment. If you do not have a PAT appointment before surgery, you may arrange to  CHG soap from our office or purchase CHG soap at a pharmacy, grocery or department store. You need to purchase TWO 4 ounce bottles to use for your 2 showers. Steps to follow:  Norman Pilsner your hair with your normal shampoo and your body with regular soap and rinse well to remove shampoo and soap from your skin. Wet a clean washcloth and turn off the shower. Put CHG soap on washcloth and apply to your entire body from the neck down. Do not use on your head, face or private parts(genitals). Do not use CHG soap on open sores, wounds or areas of skin irritation. Wash you body gently for 5 minutes. Do not wash your skin too hard. This soap does not create lather. Pay special attention to your underarms and from your belly button to your feet. Turn the shower back on and rinse well to get CHG soap off your body. Pat your skin dry with a clean, dry towel. Do not apply lotions or moisturizer. Put on clean clothes and sleep on fresh bed sheets and do not allow pets to sleep with you. Shower with CHG soap 2 times before your surgery  The evening before your surgery  The morning of your surgery      Tips to help prevent infections after your surgery:  Protect your surgical wound from germs:  Hand washing is the most important thing you and your caregivers can do to prevent infections. Keep your bandage clean and dry! Do not touch your surgical wound.   Use clean, freshly washed towels and washcloths every time you shower; do not share bath linens with others. Until your surgical wound is healed, wear clothing and sleep on bed linens each day that are clean and freshly washed. Do not allow pets to sleep in your bed with you or touch your surgical wound. Do not smoke - smoking delays wound healing. This may be a good time to stop smoking. If you have diabetes, it is important for you to manage your blood sugar levels properly before your surgery as well as after your surgery. Poorly managed blood sugar levels slow down wound healing and prevent you from healing completely. Patient Information Regarding COVID Restrictions    Day of Procedure    Please park in the parking deck or any designated visitor parking lot. Enter the facility through the Main Entrance of the hospital.  On the day of surgery, please provide the cell phone number of the person who will be waiting for you to the Patient Access representative at the time of registration. Masks are highly recommended in the hospital, but not required. Once your procedure and the immediate recovery period is completed, a nurse in the recovery area will contact your designated visitor to inform them of your room number or to otherwise review other pertinent information regarding your care. Social distancing practices are strongly encouraged in waiting areas and the cafeteria. The patient was contacted  in person. He verbalized understanding of all instructions does not  need reinforcement.

## 2022-12-30 NOTE — TELEPHONE ENCOUNTER
Patient called stating that he completed PAT yesterday and read the EKG results this morning. Patient noticed that it stated they had no prior EKGs to compare to. Patient wanted to inform provider that he did provided the PAT nurse with 2 EKGs from the late 80s.

## 2022-12-30 NOTE — TELEPHONE ENCOUNTER
Returned call to patient and verified using 2 patient identifiers. He wanted to make  aware that he did have a EKG that was done back in 8/2017 that shows that same exact readings as the one that was done today. He also provided PAT's copy as well. He will also try and upload or fax the results to Dr. Monica Oleary for him to review. Will forward to Provider.

## 2023-01-03 NOTE — PERIOP NOTES
MESSAGE SENT TO MIKE MACIAS AT DR. Libby Harris OFFICE RE:  GOOD MORNING MIKE,    PLEASE HAVE DR. HUYNH REVIEW LAB WORK THAT WAS ALREADY DONE ON PATIENT ON 12/23/22 - CBC AND CMP - THAT WE DID NOT REPEAT DURING PAT APPOINTMENT. RESULTS ARE IN CC.    EKG SENT TO ANESTHESIA FOR REVIEW AND CONSULT. DR. Mague Braun CONSULTED EKG OKAY FOR SURGERY.

## 2023-01-06 ENCOUNTER — ANESTHESIA (OUTPATIENT)
Dept: SURGERY | Age: 73
DRG: 406 | End: 2023-01-06
Payer: MEDICARE

## 2023-01-06 ENCOUNTER — ANESTHESIA EVENT (OUTPATIENT)
Dept: SURGERY | Age: 73
DRG: 406 | End: 2023-01-06
Payer: MEDICARE

## 2023-01-06 ENCOUNTER — HOSPITAL ENCOUNTER (INPATIENT)
Age: 73
LOS: 1 days | Discharge: HOME | DRG: 406 | End: 2023-01-07
Attending: SURGERY | Admitting: SURGERY
Payer: MEDICARE

## 2023-01-06 DIAGNOSIS — K76.89 HEPATIC CYST: ICD-10-CM

## 2023-01-06 DIAGNOSIS — K76.89 HEPATIC CYST: Primary | ICD-10-CM

## 2023-01-06 PROCEDURE — P9045 ALBUMIN (HUMAN), 5%, 250 ML: HCPCS | Performed by: NURSE ANESTHETIST, CERTIFIED REGISTERED

## 2023-01-06 PROCEDURE — 88307 TISSUE EXAM BY PATHOLOGIST: CPT

## 2023-01-06 PROCEDURE — 65270000032 HC RM SEMIPRIVATE

## 2023-01-06 PROCEDURE — G0378 HOSPITAL OBSERVATION PER HR: HCPCS

## 2023-01-06 PROCEDURE — 76060000036 HC ANESTHESIA 2.5 TO 3 HR: Performed by: SURGERY

## 2023-01-06 PROCEDURE — 76210000003 HC OR PH I REC 3.5 TO 4 HR: Performed by: SURGERY

## 2023-01-06 PROCEDURE — S2900 ROBOTIC SURGICAL SYSTEM: HCPCS | Performed by: SURGERY

## 2023-01-06 PROCEDURE — 74011000250 HC RX REV CODE- 250: Performed by: SURGERY

## 2023-01-06 PROCEDURE — 76010000877 HC OR TIME 2.5 TO 3HR INTENSV - TIER 2: Performed by: SURGERY

## 2023-01-06 PROCEDURE — 77030003578 HC NDL INSUF VERES AMR -B: Performed by: SURGERY

## 2023-01-06 PROCEDURE — 77030002916 HC SUT ETHLN J&J -A: Performed by: SURGERY

## 2023-01-06 PROCEDURE — 77030013079 HC BLNKT BAIR HGGR 3M -A: Performed by: NURSE ANESTHETIST, CERTIFIED REGISTERED

## 2023-01-06 PROCEDURE — 77030040922 HC BLNKT HYPOTHRM STRY -A

## 2023-01-06 PROCEDURE — 77030020703 HC SEAL CANN DISP INTU -B: Performed by: SURGERY

## 2023-01-06 PROCEDURE — 77030008606 HC TRCR ENDOSC KII AMR -B: Performed by: SURGERY

## 2023-01-06 PROCEDURE — 77030035277 HC OBTRTR BLDELSS DISP INTU -B: Performed by: SURGERY

## 2023-01-06 PROCEDURE — 74011250636 HC RX REV CODE- 250/636: Performed by: ANESTHESIOLOGY

## 2023-01-06 PROCEDURE — 77030040361 HC SLV COMPR DVT MDII -B: Performed by: SURGERY

## 2023-01-06 PROCEDURE — 74011250636 HC RX REV CODE- 250/636: Performed by: NURSE ANESTHETIST, CERTIFIED REGISTERED

## 2023-01-06 PROCEDURE — 2709999900 HC NON-CHARGEABLE SUPPLY: Performed by: SURGERY

## 2023-01-06 PROCEDURE — 77030010507 HC ADH SKN DERMBND J&J -B: Performed by: SURGERY

## 2023-01-06 PROCEDURE — 77030026438 HC STYL ET INTUB CARD -A: Performed by: NURSE ANESTHETIST, CERTIFIED REGISTERED

## 2023-01-06 PROCEDURE — 77030031492 HC PRT ACC BLNT AIRSEAL CNMD -B: Performed by: SURGERY

## 2023-01-06 PROCEDURE — 74011250637 HC RX REV CODE- 250/637: Performed by: ANESTHESIOLOGY

## 2023-01-06 PROCEDURE — 77030008684 HC TU ET CUF COVD -B: Performed by: NURSE ANESTHETIST, CERTIFIED REGISTERED

## 2023-01-06 PROCEDURE — 74011250637 HC RX REV CODE- 250/637: Performed by: SURGERY

## 2023-01-06 PROCEDURE — 74011250636 HC RX REV CODE- 250/636: Performed by: SURGERY

## 2023-01-06 PROCEDURE — 0FB04ZZ EXCISION OF LIVER, PERCUTANEOUS ENDOSCOPIC APPROACH: ICD-10-PCS | Performed by: SURGERY

## 2023-01-06 PROCEDURE — 8E0W4CZ ROBOTIC ASSISTED PROCEDURE OF TRUNK REGION, PERCUTANEOUS ENDOSCOPIC APPROACH: ICD-10-PCS | Performed by: SURGERY

## 2023-01-06 PROCEDURE — 77030008756 HC TU IRR SUC STRY -B: Performed by: SURGERY

## 2023-01-06 PROCEDURE — 77030031139 HC SUT VCRL2 J&J -A: Performed by: SURGERY

## 2023-01-06 PROCEDURE — 77030028402 HC SYS LAPSC TISS RETRV AMR -B: Performed by: SURGERY

## 2023-01-06 PROCEDURE — 47379 UNLISTED LAPS PX LIVER: CPT | Performed by: PHYSICIAN ASSISTANT

## 2023-01-06 PROCEDURE — 47379 UNLISTED LAPS PX LIVER: CPT | Performed by: SURGERY

## 2023-01-06 PROCEDURE — 77030002933 HC SUT MCRYL J&J -A: Performed by: SURGERY

## 2023-01-06 PROCEDURE — 77030016151 HC PROTCTR LNS DFOG COVD -B: Performed by: SURGERY

## 2023-01-06 PROCEDURE — 74011000250 HC RX REV CODE- 250: Performed by: NURSE ANESTHETIST, CERTIFIED REGISTERED

## 2023-01-06 PROCEDURE — 77030029640 HC SEAL VSL ENDOWR ONE INTU -F: Performed by: SURGERY

## 2023-01-06 PROCEDURE — C9290 INJ, BUPIVACAINE LIPOSOME: HCPCS | Performed by: SURGERY

## 2023-01-06 PROCEDURE — 77030002966 HC SUT PDS J&J -A: Performed by: SURGERY

## 2023-01-06 PROCEDURE — 0FB24ZZ EXCISION OF LEFT LOBE LIVER, PERCUTANEOUS ENDOSCOPIC APPROACH: ICD-10-PCS | Performed by: SURGERY

## 2023-01-06 PROCEDURE — 77030005513 HC CATH URETH FOL11 MDII -B: Performed by: SURGERY

## 2023-01-06 RX ORDER — SODIUM CHLORIDE 0.9 % (FLUSH) 0.9 %
5-40 SYRINGE (ML) INJECTION EVERY 8 HOURS
Status: DISCONTINUED | OUTPATIENT
Start: 2023-01-06 | End: 2023-01-07 | Stop reason: HOSPADM

## 2023-01-06 RX ORDER — MIDAZOLAM HYDROCHLORIDE 1 MG/ML
1 INJECTION, SOLUTION INTRAMUSCULAR; INTRAVENOUS AS NEEDED
Status: DISCONTINUED | OUTPATIENT
Start: 2023-01-06 | End: 2023-01-06 | Stop reason: HOSPADM

## 2023-01-06 RX ORDER — NALOXONE HYDROCHLORIDE 0.4 MG/ML
0.4 INJECTION, SOLUTION INTRAMUSCULAR; INTRAVENOUS; SUBCUTANEOUS AS NEEDED
Status: DISCONTINUED | OUTPATIENT
Start: 2023-01-06 | End: 2023-01-07 | Stop reason: HOSPADM

## 2023-01-06 RX ORDER — DIPHENHYDRAMINE HYDROCHLORIDE 50 MG/ML
12.5 INJECTION, SOLUTION INTRAMUSCULAR; INTRAVENOUS AS NEEDED
Status: DISCONTINUED | OUTPATIENT
Start: 2023-01-06 | End: 2023-01-06 | Stop reason: HOSPADM

## 2023-01-06 RX ORDER — ONDANSETRON 2 MG/ML
INJECTION INTRAMUSCULAR; INTRAVENOUS AS NEEDED
Status: DISCONTINUED | OUTPATIENT
Start: 2023-01-06 | End: 2023-01-06 | Stop reason: HOSPADM

## 2023-01-06 RX ORDER — SODIUM CHLORIDE 9 MG/ML
50 INJECTION, SOLUTION INTRAVENOUS CONTINUOUS
Status: DISCONTINUED | OUTPATIENT
Start: 2023-01-06 | End: 2023-01-06 | Stop reason: HOSPADM

## 2023-01-06 RX ORDER — SODIUM CHLORIDE 0.9 % (FLUSH) 0.9 %
5-40 SYRINGE (ML) INJECTION AS NEEDED
Status: DISCONTINUED | OUTPATIENT
Start: 2023-01-06 | End: 2023-01-06 | Stop reason: HOSPADM

## 2023-01-06 RX ORDER — SODIUM CHLORIDE 0.9 % (FLUSH) 0.9 %
5-40 SYRINGE (ML) INJECTION EVERY 8 HOURS
Status: DISCONTINUED | OUTPATIENT
Start: 2023-01-06 | End: 2023-01-06 | Stop reason: HOSPADM

## 2023-01-06 RX ORDER — MIDAZOLAM HYDROCHLORIDE 1 MG/ML
INJECTION, SOLUTION INTRAMUSCULAR; INTRAVENOUS AS NEEDED
Status: DISCONTINUED | OUTPATIENT
Start: 2023-01-06 | End: 2023-01-06 | Stop reason: HOSPADM

## 2023-01-06 RX ORDER — HYDROMORPHONE HYDROCHLORIDE 1 MG/ML
0.2 INJECTION, SOLUTION INTRAMUSCULAR; INTRAVENOUS; SUBCUTANEOUS
Status: DISCONTINUED | OUTPATIENT
Start: 2023-01-06 | End: 2023-01-06 | Stop reason: HOSPADM

## 2023-01-06 RX ORDER — OXYCODONE AND ACETAMINOPHEN 5; 325 MG/1; MG/1
1 TABLET ORAL AS NEEDED
Status: DISCONTINUED | OUTPATIENT
Start: 2023-01-06 | End: 2023-01-06 | Stop reason: HOSPADM

## 2023-01-06 RX ORDER — SUCCINYLCHOLINE CHLORIDE 20 MG/ML
INJECTION INTRAMUSCULAR; INTRAVENOUS AS NEEDED
Status: DISCONTINUED | OUTPATIENT
Start: 2023-01-06 | End: 2023-01-06 | Stop reason: HOSPADM

## 2023-01-06 RX ORDER — HYDROCHLOROTHIAZIDE 25 MG/1
25 TABLET ORAL DAILY
Status: DISCONTINUED | OUTPATIENT
Start: 2023-01-07 | End: 2023-01-07 | Stop reason: HOSPADM

## 2023-01-06 RX ORDER — DEXAMETHASONE SODIUM PHOSPHATE 4 MG/ML
INJECTION, SOLUTION INTRA-ARTICULAR; INTRALESIONAL; INTRAMUSCULAR; INTRAVENOUS; SOFT TISSUE AS NEEDED
Status: DISCONTINUED | OUTPATIENT
Start: 2023-01-06 | End: 2023-01-06 | Stop reason: HOSPADM

## 2023-01-06 RX ORDER — VALSARTAN 160 MG/1
160 TABLET ORAL DAILY
Status: DISCONTINUED | OUTPATIENT
Start: 2023-01-07 | End: 2023-01-07 | Stop reason: HOSPADM

## 2023-01-06 RX ORDER — GLYCOPYRROLATE 0.2 MG/ML
INJECTION INTRAMUSCULAR; INTRAVENOUS AS NEEDED
Status: DISCONTINUED | OUTPATIENT
Start: 2023-01-06 | End: 2023-01-06 | Stop reason: HOSPADM

## 2023-01-06 RX ORDER — FENTANYL CITRATE 50 UG/ML
INJECTION, SOLUTION INTRAMUSCULAR; INTRAVENOUS AS NEEDED
Status: DISCONTINUED | OUTPATIENT
Start: 2023-01-06 | End: 2023-01-06 | Stop reason: HOSPADM

## 2023-01-06 RX ORDER — ACETAMINOPHEN 325 MG/1
650 TABLET ORAL
Status: DISCONTINUED | OUTPATIENT
Start: 2023-01-06 | End: 2023-01-07 | Stop reason: HOSPADM

## 2023-01-06 RX ORDER — KETOROLAC TROMETHAMINE 30 MG/ML
15 INJECTION, SOLUTION INTRAMUSCULAR; INTRAVENOUS
Status: DISCONTINUED | OUTPATIENT
Start: 2023-01-06 | End: 2023-01-07 | Stop reason: HOSPADM

## 2023-01-06 RX ORDER — ONDANSETRON 2 MG/ML
4 INJECTION INTRAMUSCULAR; INTRAVENOUS
Status: DISCONTINUED | OUTPATIENT
Start: 2023-01-06 | End: 2023-01-07 | Stop reason: HOSPADM

## 2023-01-06 RX ORDER — HEPARIN SODIUM 5000 [USP'U]/ML
5000 INJECTION, SOLUTION INTRAVENOUS; SUBCUTANEOUS EVERY 8 HOURS
Status: DISCONTINUED | OUTPATIENT
Start: 2023-01-06 | End: 2023-01-07 | Stop reason: HOSPADM

## 2023-01-06 RX ORDER — HYDROMORPHONE HYDROCHLORIDE 1 MG/ML
0.5 INJECTION, SOLUTION INTRAMUSCULAR; INTRAVENOUS; SUBCUTANEOUS
Status: DISCONTINUED | OUTPATIENT
Start: 2023-01-06 | End: 2023-01-07 | Stop reason: HOSPADM

## 2023-01-06 RX ORDER — DIPHENHYDRAMINE HYDROCHLORIDE 50 MG/ML
12.5 INJECTION, SOLUTION INTRAMUSCULAR; INTRAVENOUS
Status: DISCONTINUED | OUTPATIENT
Start: 2023-01-06 | End: 2023-01-07 | Stop reason: HOSPADM

## 2023-01-06 RX ORDER — DOCUSATE SODIUM 100 MG/1
100 CAPSULE, LIQUID FILLED ORAL 2 TIMES DAILY
Status: DISCONTINUED | OUTPATIENT
Start: 2023-01-06 | End: 2023-01-07 | Stop reason: HOSPADM

## 2023-01-06 RX ORDER — OXYCODONE AND ACETAMINOPHEN 10; 325 MG/1; MG/1
1 TABLET ORAL
Status: DISCONTINUED | OUTPATIENT
Start: 2023-01-06 | End: 2023-01-07 | Stop reason: HOSPADM

## 2023-01-06 RX ORDER — SODIUM CHLORIDE, SODIUM LACTATE, POTASSIUM CHLORIDE, CALCIUM CHLORIDE 600; 310; 30; 20 MG/100ML; MG/100ML; MG/100ML; MG/100ML
125 INJECTION, SOLUTION INTRAVENOUS CONTINUOUS
Status: DISCONTINUED | OUTPATIENT
Start: 2023-01-06 | End: 2023-01-06 | Stop reason: HOSPADM

## 2023-01-06 RX ORDER — LIDOCAINE HYDROCHLORIDE 20 MG/ML
INJECTION, SOLUTION EPIDURAL; INFILTRATION; INTRACAUDAL; PERINEURAL AS NEEDED
Status: DISCONTINUED | OUTPATIENT
Start: 2023-01-06 | End: 2023-01-06 | Stop reason: HOSPADM

## 2023-01-06 RX ORDER — LIDOCAINE HYDROCHLORIDE 10 MG/ML
0.1 INJECTION, SOLUTION EPIDURAL; INFILTRATION; INTRACAUDAL; PERINEURAL AS NEEDED
Status: DISCONTINUED | OUTPATIENT
Start: 2023-01-06 | End: 2023-01-06 | Stop reason: HOSPADM

## 2023-01-06 RX ORDER — SODIUM CHLORIDE, SODIUM LACTATE, POTASSIUM CHLORIDE, CALCIUM CHLORIDE 600; 310; 30; 20 MG/100ML; MG/100ML; MG/100ML; MG/100ML
75 INJECTION, SOLUTION INTRAVENOUS CONTINUOUS
Status: DISCONTINUED | OUTPATIENT
Start: 2023-01-06 | End: 2023-01-06

## 2023-01-06 RX ORDER — MIDAZOLAM HYDROCHLORIDE 1 MG/ML
0.5 INJECTION, SOLUTION INTRAMUSCULAR; INTRAVENOUS
Status: DISCONTINUED | OUTPATIENT
Start: 2023-01-06 | End: 2023-01-06 | Stop reason: HOSPADM

## 2023-01-06 RX ORDER — ROCURONIUM BROMIDE 10 MG/ML
INJECTION, SOLUTION INTRAVENOUS AS NEEDED
Status: DISCONTINUED | OUTPATIENT
Start: 2023-01-06 | End: 2023-01-06 | Stop reason: HOSPADM

## 2023-01-06 RX ORDER — SODIUM CHLORIDE 0.9 % (FLUSH) 0.9 %
5-40 SYRINGE (ML) INJECTION AS NEEDED
Status: DISCONTINUED | OUTPATIENT
Start: 2023-01-06 | End: 2023-01-07 | Stop reason: HOSPADM

## 2023-01-06 RX ORDER — ONDANSETRON 2 MG/ML
4 INJECTION INTRAMUSCULAR; INTRAVENOUS AS NEEDED
Status: DISCONTINUED | OUTPATIENT
Start: 2023-01-06 | End: 2023-01-06 | Stop reason: HOSPADM

## 2023-01-06 RX ORDER — PHENYLEPHRINE HCL IN 0.9% NACL 0.4MG/10ML
SYRINGE (ML) INTRAVENOUS
Status: DISCONTINUED | OUTPATIENT
Start: 2023-01-06 | End: 2023-01-06 | Stop reason: HOSPADM

## 2023-01-06 RX ORDER — SODIUM CHLORIDE 9 MG/ML
1000 INJECTION, SOLUTION INTRAVENOUS CONTINUOUS
Status: DISCONTINUED | OUTPATIENT
Start: 2023-01-06 | End: 2023-01-06 | Stop reason: HOSPADM

## 2023-01-06 RX ORDER — PROPOFOL 10 MG/ML
INJECTION, EMULSION INTRAVENOUS AS NEEDED
Status: DISCONTINUED | OUTPATIENT
Start: 2023-01-06 | End: 2023-01-06 | Stop reason: HOSPADM

## 2023-01-06 RX ORDER — MORPHINE SULFATE 2 MG/ML
2 INJECTION, SOLUTION INTRAMUSCULAR; INTRAVENOUS
Status: DISCONTINUED | OUTPATIENT
Start: 2023-01-06 | End: 2023-01-06 | Stop reason: HOSPADM

## 2023-01-06 RX ORDER — ACETAMINOPHEN 325 MG/1
650 TABLET ORAL ONCE
Status: COMPLETED | OUTPATIENT
Start: 2023-01-06 | End: 2023-01-06

## 2023-01-06 RX ORDER — BUPIVACAINE HYDROCHLORIDE 5 MG/ML
30 INJECTION, SOLUTION EPIDURAL; INTRACAUDAL ONCE
Status: COMPLETED | OUTPATIENT
Start: 2023-01-06 | End: 2023-01-06

## 2023-01-06 RX ORDER — FENTANYL CITRATE 50 UG/ML
25 INJECTION, SOLUTION INTRAMUSCULAR; INTRAVENOUS
Status: COMPLETED | OUTPATIENT
Start: 2023-01-06 | End: 2023-01-06

## 2023-01-06 RX ORDER — OXYCODONE AND ACETAMINOPHEN 5; 325 MG/1; MG/1
1 TABLET ORAL
Status: DISCONTINUED | OUTPATIENT
Start: 2023-01-06 | End: 2023-01-07 | Stop reason: HOSPADM

## 2023-01-06 RX ORDER — FAMOTIDINE 20 MG/1
20 TABLET, FILM COATED ORAL
Status: DISCONTINUED | OUTPATIENT
Start: 2023-01-06 | End: 2023-01-07 | Stop reason: HOSPADM

## 2023-01-06 RX ORDER — HYDROMORPHONE HYDROCHLORIDE 2 MG/ML
INJECTION, SOLUTION INTRAMUSCULAR; INTRAVENOUS; SUBCUTANEOUS AS NEEDED
Status: DISCONTINUED | OUTPATIENT
Start: 2023-01-06 | End: 2023-01-06 | Stop reason: HOSPADM

## 2023-01-06 RX ORDER — OXYCODONE AND ACETAMINOPHEN 5; 325 MG/1; MG/1
1 TABLET ORAL
Qty: 20 TABLET | Refills: 0 | Status: SHIPPED | OUTPATIENT
Start: 2023-01-06 | End: 2023-01-13

## 2023-01-06 RX ORDER — FENTANYL CITRATE 50 UG/ML
50 INJECTION, SOLUTION INTRAMUSCULAR; INTRAVENOUS AS NEEDED
Status: DISCONTINUED | OUTPATIENT
Start: 2023-01-06 | End: 2023-01-06 | Stop reason: HOSPADM

## 2023-01-06 RX ORDER — ALBUMIN HUMAN 50 G/1000ML
SOLUTION INTRAVENOUS AS NEEDED
Status: DISCONTINUED | OUTPATIENT
Start: 2023-01-06 | End: 2023-01-06 | Stop reason: HOSPADM

## 2023-01-06 RX ORDER — EPHEDRINE SULFATE/0.9% NACL/PF 50 MG/5 ML
SYRINGE (ML) INTRAVENOUS AS NEEDED
Status: DISCONTINUED | OUTPATIENT
Start: 2023-01-06 | End: 2023-01-06 | Stop reason: HOSPADM

## 2023-01-06 RX ADMIN — MIDAZOLAM 1 MG: 1 INJECTION INTRAMUSCULAR; INTRAVENOUS at 07:29

## 2023-01-06 RX ADMIN — Medication 40 MCG/MIN: at 07:48

## 2023-01-06 RX ADMIN — HEPARIN SODIUM 5000 UNITS: 5000 INJECTION INTRAVENOUS; SUBCUTANEOUS at 18:20

## 2023-01-06 RX ADMIN — Medication 80 MCG: at 08:35

## 2023-01-06 RX ADMIN — SUGAMMADEX 200 MG: 100 INJECTION, SOLUTION INTRAVENOUS at 09:58

## 2023-01-06 RX ADMIN — FAMOTIDINE 20 MG: 20 TABLET, FILM COATED ORAL at 18:20

## 2023-01-06 RX ADMIN — ONDANSETRON HYDROCHLORIDE 4 MG: 2 INJECTION, SOLUTION INTRAMUSCULAR; INTRAVENOUS at 09:44

## 2023-01-06 RX ADMIN — ACETAMINOPHEN 650 MG: 325 TABLET ORAL at 06:45

## 2023-01-06 RX ADMIN — ROCURONIUM BROMIDE 10 MG: 10 SOLUTION INTRAVENOUS at 08:10

## 2023-01-06 RX ADMIN — DEXAMETHASONE SODIUM PHOSPHATE 8 MG: 4 INJECTION, SOLUTION INTRAMUSCULAR; INTRAVENOUS at 07:45

## 2023-01-06 RX ADMIN — MIDAZOLAM 1 MG: 1 INJECTION INTRAMUSCULAR; INTRAVENOUS at 07:26

## 2023-01-06 RX ADMIN — ALBUMIN (HUMAN) 250 ML: 12.5 INJECTION, SOLUTION INTRAVENOUS at 08:38

## 2023-01-06 RX ADMIN — FENTANYL CITRATE 25 MCG: 50 INJECTION, SOLUTION INTRAMUSCULAR; INTRAVENOUS at 10:30

## 2023-01-06 RX ADMIN — FENTANYL CITRATE 25 MCG: 50 INJECTION, SOLUTION INTRAMUSCULAR; INTRAVENOUS at 10:50

## 2023-01-06 RX ADMIN — SODIUM CHLORIDE, POTASSIUM CHLORIDE, SODIUM LACTATE AND CALCIUM CHLORIDE: 600; 310; 30; 20 INJECTION, SOLUTION INTRAVENOUS at 09:44

## 2023-01-06 RX ADMIN — FENTANYL CITRATE 25 MCG: 50 INJECTION, SOLUTION INTRAMUSCULAR; INTRAVENOUS at 10:39

## 2023-01-06 RX ADMIN — SODIUM CHLORIDE, POTASSIUM CHLORIDE, SODIUM LACTATE AND CALCIUM CHLORIDE 75 ML/HR: 600; 310; 30; 20 INJECTION, SOLUTION INTRAVENOUS at 11:15

## 2023-01-06 RX ADMIN — HYDROMORPHONE HYDROCHLORIDE 0.5 MG: 2 INJECTION, SOLUTION INTRAMUSCULAR; INTRAVENOUS; SUBCUTANEOUS at 08:59

## 2023-01-06 RX ADMIN — WATER 2 G: 1 INJECTION INTRAMUSCULAR; INTRAVENOUS; SUBCUTANEOUS at 07:45

## 2023-01-06 RX ADMIN — SUCCINYLCHOLINE CHLORIDE 140 MG: 20 INJECTION, SOLUTION INTRAMUSCULAR; INTRAVENOUS at 07:34

## 2023-01-06 RX ADMIN — ROCURONIUM BROMIDE 20 MG: 10 SOLUTION INTRAVENOUS at 08:35

## 2023-01-06 RX ADMIN — GLYCOPYRROLATE 0.2 MG: 0.2 INJECTION INTRAMUSCULAR; INTRAVENOUS at 07:51

## 2023-01-06 RX ADMIN — FENTANYL CITRATE 50 MCG: 50 INJECTION, SOLUTION INTRAMUSCULAR; INTRAVENOUS at 07:34

## 2023-01-06 RX ADMIN — FENTANYL CITRATE 50 MCG: 50 INJECTION, SOLUTION INTRAMUSCULAR; INTRAVENOUS at 08:12

## 2023-01-06 RX ADMIN — LIDOCAINE HYDROCHLORIDE 80 MG: 20 INJECTION, SOLUTION EPIDURAL; INFILTRATION; INTRACAUDAL; PERINEURAL at 07:34

## 2023-01-06 RX ADMIN — ROCURONIUM BROMIDE 45 MG: 10 SOLUTION INTRAVENOUS at 07:45

## 2023-01-06 RX ADMIN — SODIUM CHLORIDE, POTASSIUM CHLORIDE, SODIUM LACTATE AND CALCIUM CHLORIDE 125 ML/HR: 600; 310; 30; 20 INJECTION, SOLUTION INTRAVENOUS at 06:43

## 2023-01-06 RX ADMIN — PROPOFOL 50 MG: 10 INJECTION, EMULSION INTRAVENOUS at 07:35

## 2023-01-06 RX ADMIN — PROPOFOL 150 MG: 10 INJECTION, EMULSION INTRAVENOUS at 07:34

## 2023-01-06 RX ADMIN — Medication 80 MCG: at 07:49

## 2023-01-06 RX ADMIN — HYDROMORPHONE HYDROCHLORIDE 0.5 MG: 2 INJECTION, SOLUTION INTRAMUSCULAR; INTRAVENOUS; SUBCUTANEOUS at 10:08

## 2023-01-06 RX ADMIN — ROCURONIUM BROMIDE 5 MG: 10 SOLUTION INTRAVENOUS at 07:34

## 2023-01-06 RX ADMIN — FENTANYL CITRATE 25 MCG: 50 INJECTION, SOLUTION INTRAMUSCULAR; INTRAVENOUS at 10:45

## 2023-01-06 RX ADMIN — Medication 5 MG: at 07:51

## 2023-01-06 RX ADMIN — KETOROLAC TROMETHAMINE 15 MG: 30 INJECTION, SOLUTION INTRAMUSCULAR; INTRAVENOUS at 21:01

## 2023-01-06 NOTE — BRIEF OP NOTE
Brief Postoperative Note    Patient: Sunday Slider  YOB: 1950  MRN: [id number]    Date of Procedure: 1/6/2023     Pre-Op Diagnosis: PLD (polycystic liver disease) [Q44.6]    Post-Op Diagnosis: Same as preoperative diagnosis. Procedure(s):  ROBOTIC HEPATIC CYST MARSUPIALIZATION X 3    Surgeon(s): MD Thai Berger MD    Surgical Assistant: Physician Assistant: ZAIDA Knight    Anesthesia: General     Estimated Blood Loss (mL): 15 mL    Complications: None    Specimens:   ID Type Source Tests Collected by Time Destination   1 : HEPATIC CYST WALL Fresh Liver  Leisa Prince MD 1/6/2023 9282 Pathology        Implants: * No implants in log *    Drains: * No LDAs found *    Findings: Numerous cysts throughout liver but 3 dominant cysts (2 in left lobe and 1 in segment 7/8) that were much larger and appeared to be causing his symptoms of pain, shortness of breath, reflux and early satiety. The cyst in segment 7/8 had old hemorrhagic contents within the cyst.  The left lobe cysts contained clear, simple fluid.       Electronically Signed by Gaby Harp MD on 1/6/2023 at 10:03 AM

## 2023-01-06 NOTE — DISCHARGE INSTRUCTIONS
[address] [facility]   Post Operative Instructions    Procedure: Procedure(s):  ROBOTIC HEPATIC CYST MARSUPIALIZATION X 3    Please return to your surgeon's  office for a 2 week follow-up appointment. Future Appointments   Date Time Provider Cathi Sexton   1/19/2023  9:20 AM Ephraim Alicea MD [facility] BS AMB       Office address is: [address] [place] [address] [facility]/ Alex Castanorosio [address] [place] [address]  [phone]      Discharge Instructions: You may resume you usual diet as well as your usual medications. You are not cleared to drive if you are taking narcotic pain medication. If you are only using tylenol for pain and are comfortable sitting in a car, you may drive. No heavy lifting. No strenuous exercise. You are cleared to go up and down stairs. You may shower but no baths or swimming. Your incisions dont need any special care. You can wash them gently with  warm soap and water daily and pat them dry. Your stitches are under the skin and dont need to be removed. Ask you doctor when you can return to work. Call our office at  [phone] to make a 2 week follow up appointment. Call our office with any problems, questions or concerns. Call our office if you have any     Fevers of 101 or higher   Worsening pain  Nausea/Vomiting  Difficulty eating or drinking  Incisions that are red, open, draining or tender.

## 2023-01-06 NOTE — PERIOP NOTES
Patient: Helder Quevedo MRN: [id number]  SSN: [id number]   YOB: 1950  Age: 81 y.o.   Sex: male     Patient is status post Procedure(s):  ROBOTIC HEPATIC CYST MARSUPIALIZATION X 3.    Surgeon(s) and Role:     * Elissa Beard MD - Primary     * Hermelinda Montez MD - Resident - Assisting    Local/Dose/Irrigation:  0.5% BUPIVACAINE AND EXPAREL                  Peripheral IV 01/06/23 Distal;Left;Posterior Forearm (Active)            Airway - Endotracheal Tube 01/06/23 Oral (Active)                   Dressing/Packing:  Incision 01/06/23 Abdomen-Dressing/Treatment: Surgical glue (01/06/23 0955)    Splint/Cast:  ]    Other:

## 2023-01-06 NOTE — PERIOP NOTES
TRANSFER - OUT REPORT:    Verbal report given to JOHN Mckeon on Margaret Sunshine  being transferred to Room 508 for routine progression of care       Report consisted of patients Situation, Background, Assessment and   Recommendations(SBAR). Time Pre op antibiotic [id number]  Anesthesia Stop time: 1010    Information from the following report(s) Procedure Summary, Intake/Output, and MAR was reviewed with the receiving nurse. Opportunity for questions and clarification was provided. Is the patient on 02? NO       L/Min        Other     Is the patient on a monitor? NO    Is the nurse transporting with the patient? NO    Surgical Waiting Area notified of patient's transfer from PACU? YES      The following personal items collected during your admission accompanied patient upon transfer:   Dental Appliance: Dental Appliances: None  Vision:    Hearing Aid:    Jewelry:    Clothing: Clothing:  At bedside (clothes, eye glasses and cane returned in PACU)  Other Valuables:    Valuables sent to safe:

## 2023-01-06 NOTE — PERIOP NOTES
0.9% NORMAL SALINE, 1 LITER, USED PRN ON STERILE FIELD.     1 LITER NORMAL SALINE USED PRN VIA SUCTION .

## 2023-01-06 NOTE — PERIOP NOTES
PATIENT INTERVIEWED IN PREOP. NAME BAND VISIBLE AND CORRECT PER PATIENT. PATIENT HAS UNDERSTANDING OF PROCEDURE AND SURGICAL SITE. EDUCATIONAL NEEDS MET. PATIENT STATES ABDOMINAL PAIN AT 3-4.

## 2023-01-06 NOTE — ANESTHESIA POSTPROCEDURE EVALUATION
Procedure(s):  ROBOTIC HEPATIC CYST MARSUPIALIZATION X 3.    general    Anesthesia Post Evaluation      Multimodal analgesia: multimodal analgesia used between 6 hours prior to anesthesia start to PACU discharge  Patient location during evaluation: PACU  Patient participation: complete - patient participated  Level of consciousness: awake  Pain score: 2  Pain management: adequate  Airway patency: patent  Anesthetic complications: no  Cardiovascular status: acceptable  Respiratory status: acceptable  Hydration status: acceptable  Comments: I have evaluated the patient and meets criteria for discharge from PACU. Michelle Ch MD  Post anesthesia nausea and vomiting:  controlled  Final Post Anesthesia Temperature Assessment:  Normothermia (36.0-37.5 degrees C)      INITIAL Post-op Vital signs:   Vitals Value Taken Time   /88 01/06/23 1131   Temp 36.6 °C (97.9 °F) 01/06/23 1128   Pulse 72 01/06/23 1133   Resp 15 01/06/23 1133   SpO2 97 % 01/06/23 1133   Vitals shown include unvalidated device data.

## 2023-01-06 NOTE — ANESTHESIA PREPROCEDURE EVALUATION
Relevant Problems   CARDIOVASCULAR   (+) Coronary atherosclerosis   (+) Essential hypertension   (+) Hypertension      GASTROINTESTINAL   (+) Hepatic cyst   (+) Polycystic liver disease      RENAL FAILURE   (+) Polycystic renal disease      ENDOCRINE   (+) Lateral epicondylitis of elbow   (+) Obesity       Anesthetic History   No history of anesthetic complications            Review of Systems / Medical History  Patient summary reviewed, nursing notes reviewed and pertinent labs reviewed    Pulmonary  Within defined limits                 Neuro/Psych   Within defined limits           Cardiovascular  Within defined limits  Hypertension                   GI/Hepatic/Renal  Within defined limits       Renal disease: CRI  Liver disease     Endo/Other  Within defined limits      Arthritis     Other Findings              Physical Exam    Airway  Mallampati: II  TM Distance: > 6 cm  Neck ROM: normal range of motion   Mouth opening: Normal     Cardiovascular  Regular rate and rhythm,  S1 and S2 normal,  no murmur, click, rub, or gallop             Dental  No notable dental hx       Pulmonary  Breath sounds clear to auscultation               Abdominal  GI exam deferred       Other Findings            Anesthetic Plan    ASA: 2  Anesthesia type: general          Induction: Intravenous  Anesthetic plan and risks discussed with: Patient

## 2023-01-06 NOTE — H&P
Date of Surgery Update:  Wyatt Roberts was seen and examined. History and physical has been reviewed. The patient has been examined. There have been no significant clinical changes since the completion of the originally dated History and Physical.  Patient with polycystic liver and polycystic kidney disease. He has several very large hepatic cysts that are symptomatic and causing significant abdominal pain and compressive symptoms against his stomach. Plan for robotic hepatic cyst marsupialization x 3. A complete discussion of the risks, benefits and alternatives to surgery were discussed with the patient who was keen to proceed. Signed By: Sonia Yee MD     January 6, 2023 7:18 AM         Please note from the office and include the additional information below:    Past Medical History  Past Medical History:   Diagnosis Date    Adverse effect of anesthesia May 2017    I had a run of V Tach, but stress testing was negative. No complications with subsequent anesthesia. Arthritis ? Lumbar spine, hips and knees. Chronic kidney disease First discovered 5. Polycystic kidneys, familial.  Creatinine followed every 6 months, has remained in the 1.45 -  1.57 range. Chronic pain     Low back, levoscolosis with L4-L5 stenosis (LBP)    Essential hypertension     GERD (gastroesophageal reflux disease)     Hepatic cyst     History of COVID-19 12/2022    Reflux Jan 2019? Only intermittently    Renal cyst     Skin cancer     Sun-damaged skin     Venous stasis         Past Surgical History  Past Surgical History:   Procedure Laterality Date    AMB POC MOHS 1 STAGE H/N/HF/G      HX COLONOSCOPY  May 17, 53907    HX HEENT  Nov. 2020    Bilateral blepharoplasty with brow lift. HX MOHS PROCEDURES  05/16/2017    eccrine porocarcinoma, right plantar foot by Dr. Sailaja Lopez [facility]  May 2017. Button insertion repair of left biceps tendon.     HI UNLISTED PROCEDURE ABDOMEN PERITONEUM & OMENTUM  31/49/4433    Umbilical hernia repair with mesh. AL UNLISTED PROCEDURE VASCULAR SURGERY  Closure of the right saphenous vein (treatment for venous stasis and recurrent medial R ankle ulceration)        Social History  The patient Félix Burt  reports that he quit smoking about 46 years ago. His smoking use included cigarettes. He has a 12.00 pack-year smoking history. He has never used smokeless tobacco. He reports current alcohol use of about 1.0 - 2.0 standard drink per week. He reports that he does not use drugs.      Family History  Family History   Problem Relation Age of Onset    OSTEOARTHRITIS Mother     Diabetes Mother     Hypertension Mother     Cancer Father         Prostate and bladder    Heart Disease Father     Hypertension Father     Cancer Sister     Diabetes Sister     Lung Disease Sister           Sweetie Araya MD

## 2023-01-07 VITALS
HEIGHT: 72 IN | OXYGEN SATURATION: 97 % | RESPIRATION RATE: 16 BRPM | WEIGHT: 228.99 LBS | DIASTOLIC BLOOD PRESSURE: 70 MMHG | SYSTOLIC BLOOD PRESSURE: 121 MMHG | TEMPERATURE: 98.3 F | HEART RATE: 61 BPM | BODY MASS INDEX: 31.02 KG/M2

## 2023-01-07 LAB
ALBUMIN SERPL-MCNC: 3.4 G/DL (ref 3.5–5)
ALBUMIN/GLOB SERPL: 0.9 (ref 1.1–2.2)
ALP SERPL-CCNC: 106 U/L (ref 45–117)
ALT SERPL-CCNC: 61 U/L (ref 12–78)
ANION GAP SERPL CALC-SCNC: 10 MMOL/L (ref 5–15)
AST SERPL-CCNC: 58 U/L (ref 15–37)
BASOPHILS # BLD: 0 K/UL (ref 0–0.1)
BASOPHILS NFR BLD: 0 % (ref 0–1)
BILIRUB SERPL-MCNC: 0.6 MG/DL (ref 0.2–1)
BUN SERPL-MCNC: 34 MG/DL (ref 6–20)
BUN/CREAT SERPL: 21 (ref 12–20)
CALCIUM SERPL-MCNC: 9.4 MG/DL (ref 8.5–10.1)
CHLORIDE SERPL-SCNC: 106 MMOL/L (ref 97–108)
CO2 SERPL-SCNC: 18 MMOL/L (ref 21–32)
CREAT SERPL-MCNC: 1.63 MG/DL (ref 0.7–1.3)
DIFFERENTIAL METHOD BLD: ABNORMAL
EOSINOPHIL # BLD: 0 K/UL (ref 0–0.4)
EOSINOPHIL NFR BLD: 0 % (ref 0–7)
ERYTHROCYTE [DISTWIDTH] IN BLOOD BY AUTOMATED COUNT: 13.3 % (ref 11.5–14.5)
GLOBULIN SER CALC-MCNC: 3.6 G/DL (ref 2–4)
GLUCOSE SERPL-MCNC: 108 MG/DL (ref 65–100)
HCT VFR BLD AUTO: 35.2 % (ref 36.6–50.3)
HGB BLD-MCNC: 11.3 G/DL (ref 12.1–17)
IMM GRANULOCYTES # BLD AUTO: 0 K/UL (ref 0–0.04)
IMM GRANULOCYTES NFR BLD AUTO: 0 % (ref 0–0.5)
LYMPHOCYTES # BLD: 0.6 K/UL (ref 0.8–3.5)
LYMPHOCYTES NFR BLD: 7 % (ref 12–49)
MCH RBC QN AUTO: 30.8 PG (ref 26–34)
MCHC RBC AUTO-ENTMCNC: 32.1 G/DL (ref 30–36.5)
MCV RBC AUTO: 95.9 FL (ref 80–99)
MONOCYTES # BLD: 0.4 K/UL (ref 0–1)
MONOCYTES NFR BLD: 4 % (ref 5–13)
NEUTS SEG # BLD: 8.2 K/UL (ref 1.8–8)
NEUTS SEG NFR BLD: 89 % (ref 32–75)
NRBC # BLD: 0 K/UL (ref 0–0.01)
NRBC BLD-RTO: 0 PER 100 WBC
PLATELET # BLD AUTO: 190 K/UL (ref 150–400)
PMV BLD AUTO: 10.4 FL (ref 8.9–12.9)
POTASSIUM SERPL-SCNC: 4.4 MMOL/L (ref 3.5–5.1)
PROT SERPL-MCNC: 7 G/DL (ref 6.4–8.2)
RBC # BLD AUTO: 3.67 M/UL (ref 4.1–5.7)
RBC MORPH BLD: ABNORMAL
SODIUM SERPL-SCNC: 134 MMOL/L (ref 136–145)
WBC # BLD AUTO: 9.2 K/UL (ref 4.1–11.1)

## 2023-01-07 PROCEDURE — 80053 COMPREHEN METABOLIC PANEL: CPT

## 2023-01-07 PROCEDURE — 74011250637 HC RX REV CODE- 250/637: Performed by: SURGERY

## 2023-01-07 PROCEDURE — 85025 COMPLETE CBC W/AUTO DIFF WBC: CPT

## 2023-01-07 PROCEDURE — 36415 COLL VENOUS BLD VENIPUNCTURE: CPT

## 2023-01-07 RX ADMIN — DOCUSATE SODIUM 100 MG: 100 CAPSULE, LIQUID FILLED ORAL at 09:50

## 2023-01-07 RX ADMIN — HYDROCHLOROTHIAZIDE 25 MG: 25 TABLET ORAL at 09:51

## 2023-01-07 RX ADMIN — VALSARTAN 160 MG: 160 TABLET, FILM COATED ORAL at 09:52

## 2023-01-07 RX ADMIN — ACETAMINOPHEN 650 MG: 325 TABLET ORAL at 00:30

## 2023-01-07 NOTE — PROGRESS NOTES
Progress Note    Patient: Talia Patel MRN: [id number]  SSN: [id number]    YOB: 1950  Age: 81 y.o. Sex: male      Admit Date: 2023    1 Day Post-Op    Procedure:  Procedure(s):  ROBOTIC HEPATIC CYST MARSUPIALIZATION X 3    Subjective:     No acute surgical issues. Pt is doing well. Tolerating diet. Pain is under control. Objective:     Visit Vitals  /70   Pulse 61   Temp 98.3 °F (36.8 °C)   Resp 16   Ht 6' (1.829 m)   Wt 228 lb 15.9 oz (103.9 kg)   SpO2 97%   BMI 31.06 kg/m²       Temp (24hrs), Av.8 °F (36.6 °C), Min:97.5 °F (36.4 °C), Max:98.3 °F (36.8 °C)        Physical Exam:    Gen:  NAD  Neuro:  Alert and oriented x 4  Pulm:  Unlabored  Abd:  S/ND/appropriate TTP  Wound:  C/D/I    Recent Results (from the past 24 hour(s))   METABOLIC PANEL, COMPREHENSIVE    Collection Time: 23 12:56 AM   Result Value Ref Range    Sodium 134 (L) 136 - 145 mmol/L    Potassium 4.4 3.5 - 5.1 mmol/L    Chloride 106 97 - 108 mmol/L    CO2 18 (L) 21 - 32 mmol/L    Anion gap 10 5 - 15 mmol/L    Glucose 108 (H) 65 - 100 mg/dL    BUN 34 (H) 6 - 20 MG/DL    Creatinine 1.63 (H) 0.70 - 1.30 MG/DL    BUN/Creatinine ratio 21 (H) 12 - 20      eGFR 45 (L) >60 ml/min/1.73m2    Calcium 9.4 8.5 - 10.1 MG/DL    Bilirubin, total 0.6 0.2 - 1.0 MG/DL    ALT (SGPT) 61 12 - 78 U/L    AST (SGOT) 58 (H) 15 - 37 U/L    Alk.  phosphatase 106 45 - 117 U/L    Protein, total 7.0 6.4 - 8.2 g/dL    Albumin 3.4 (L) 3.5 - 5.0 g/dL    Globulin 3.6 2.0 - 4.0 g/dL    A-G Ratio 0.9 (L) 1.1 - 2.2     CBC WITH AUTOMATED DIFF    Collection Time: 23 12:56 AM   Result Value Ref Range    WBC 9.2 4.1 - 11.1 K/uL    RBC 3.67 (L) 4.10 - 5.70 M/uL    HGB 11.3 (L) 12.1 - 17.0 g/dL    HCT 35.2 (L) 36.6 - 50.3 %    MCV 95.9 80.0 - 99.0 FL    MCH 30.8 26.0 - 34.0 PG    MCHC 32.1 30.0 - 36.5 g/dL    RDW 13.3 11.5 - 14.5 %    PLATELET 662 801 - 837 K/uL    MPV 10.4 8.9 - 12.9 FL    NRBC 0.0 0  WBC    ABSOLUTE NRBC 0.00 0.00 - 0.01 K/uL    NEUTROPHILS 89 (H) 32 - 75 %    LYMPHOCYTES 7 (L) 12 - 49 %    MONOCYTES 4 (L) 5 - 13 %    EOSINOPHILS 0 0 - 7 %    BASOPHILS 0 0 - 1 %    IMMATURE GRANULOCYTES 0 0.0 - 0.5 %    ABS. NEUTROPHILS 8.2 (H) 1.8 - 8.0 K/UL    ABS. LYMPHOCYTES 0.6 (L) 0.8 - 3.5 K/UL    ABS. MONOCYTES 0.4 0.0 - 1.0 K/UL    ABS. EOSINOPHILS 0.0 0.0 - 0.4 K/UL    ABS. BASOPHILS 0.0 0.0 - 0.1 K/UL    ABS. IMM. GRANS. 0.0 0.00 - 0.04 K/UL    DF SMEAR SCANNED      RBC COMMENTS MACROCYTOSIS  1+             Assessment:     Hospital Problems  Date Reviewed: 12/6/2022            Codes Class Noted POA    * (Principal) Hepatic cyst ICD-10-CM: K76.89  ICD-9-CM: 573.8  1/6/2023 Unknown           Plan/Recommendations/Medical Decision Making:     - Diet as tolerated  - Pain control  - Out of bed.   Encourage ambulation  - Plan dC home this am

## 2023-01-07 NOTE — PROGRESS NOTES
I have reviewed discharge instructions with the patient and spouse. Discharge medications were reviewed with the patient and appropriate educational materials and side effects teaching were provided to the patient and spouse. The patient verbalized understanding and had no further questions. IV was removed and patient was discharged home with family. Current Discharge Medication List        START taking these medications    Details   oxyCODONE-acetaminophen (PERCOCET) 5-325 mg per tablet Take 1 Tablet by mouth every six (6) hours as needed for Pain for up to 7 days. Max Daily Amount: 4 Tablets. Qty: 20 Tablet, Refills: 0  Start date: 1/6/2023, End date: 1/13/2023    Associated Diagnoses: Hepatic cyst           CONTINUE these medications which have NOT CHANGED    Details   doxycycline (MONODOX) 100 mg capsule Take 100 mg by mouth two (2) times a day. STARTED ON 12/27      famotidine (PEPCID) 20 mg tablet Take 20 mg by mouth Daily (before dinner). BEFORE DINNER      acetaminophen (TYLENOL) 500 mg tablet Take 500 mg by mouth two (2) times a day. hydroCHLOROthiazide (HYDRODIURIL) 25 mg tablet Take 25 mg by mouth daily. candesartan (ATACAND) 16 mg tablet Take  by mouth daily. OTHER  MG PO DAILY      multivitamin (ONE A DAY) tablet Take 1 Tab by mouth daily. ascorbic acid, vitamin C, (VITAMIN C) 500 mg tablet Take 500 mg by mouth two (2) times a day.

## 2023-01-07 NOTE — PROGRESS NOTES
Bedside and Verbal shift change report given to Jose Patel  (oncoming nurse) by Tripp Smith  (offgoing nurse). Report included the following information SBAR.

## 2023-01-07 NOTE — PROGRESS NOTES
Transition of Care    Reason for Admission:  PLD (polycystic liver disease)                   RUR Score:  9%    Plan for utilizing home health:    Joannefe Sanchez is not planning to utilize home heatlh services. 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 Healthcare Decision Maker:   Click here to complete [address] including selection of the Healthcare Decision Maker Relationship (ie \"Primary\")  Dr. Butch Calero is his spouse, Salma Call. 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 His address and phone number were verified. He has a cane and chair lift in his home. They live in a 2 story home with a chair lift to enter. He is retired and does drive. He was independent with his ADLs and IADLs prior to his discharge. His pharmacy is the [facility] in [place], [place]. He is able to afford and acquire his medications. His spouse will be providing transportation home at discharge. There were no home discharge needs identified at this time. Care Management Interventions  PCP Verified by CM:  Yes  Last Visit to PCP: 12/01/22  Palliative Care Criteria Met (RRAT>21 & CHF Dx)?: No  Mode of Transport at Discharge: Self  Transition of Care Consult (CM Consult): Discharge Planning  MyChart Signup: No  Discharge Durable Medical Equipment: No  Physical Therapy Consult: No  Occupational Therapy Consult: No  Speech Therapy Consult: No  Support Systems: Spouse/Significant Other  Confirm Follow Up Transport: Self  The Patient and/or Patient Representative was Provided with a Choice of Provider and Agrees with the Discharge Plan?: No  Freedom of Choice List was Provided with Basic Dialogue that Supports the Patient's Individualized Plan of Care/Goals, Treatment Preferences and Shares the Quality Data Associated with the Providers?: No   Resource Information Provided?: No  Discharge Location  Patient Expects to be Discharged to[de-identified] Home     Will continue to follow for discharge planning.   Signed By: Haylie Murillo LCSW     January 7, 2023

## 2023-01-09 ENCOUNTER — TELEPHONE (OUTPATIENT)
Dept: SURGERY | Age: 73
End: 2023-01-09

## 2023-01-09 NOTE — TELEPHONE ENCOUNTER
Patient called stating that an order was put in for a CT scan by Dr. Clementine Oropeza while Dr. Sasha Gomez was out and he continues to get calls to get scheduled for order although it was completed during a hospital stay. Patient is requesting the order be cancelled to avoid continuous calls.

## 2023-01-09 NOTE — PROGRESS NOTES
Physician Progress Note      PATIENT:               Negrito APPIAH #:                  [id number]  :                       1950  ADMIT DATE:       2023 5:30 AM  [address] DATE:        2023 1:57 PM  RESPONDING  PROVIDER #:        Kim Cruz MD        QUERY TEXT:    Stage of Chronic Kidney Disease: Please provide further specificity, if known. Clinical indicators include: chronic kidney disease, creatinine  Options provided:  -- Chronic kidney disease stage 1  -- Chronic kidney disease stage 2  -- Chronic kidney disease stage 3  -- Chronic kidney disease stage 3a  -- Chronic kidney disease stage 3b  -- Chronic kidney disease stage 4  -- Chronic kidney disease stage 5  -- Chronic kidney disease stage 5, requiring dialysis  -- End stage renal disease  -- Other - I will add my own diagnosis  -- Disagree - Not applicable / Not valid  -- Disagree - Clinically Unable to determine / Unknown        PROVIDER RESPONSE TEXT:    The patient has chronic kidney disease stage 3a.       Electronically signed by:  Kim Cruz MD 2023 4:03 PM

## 2023-01-10 ENCOUNTER — DOCUMENTATION ONLY (OUTPATIENT)
Dept: SURGERY | Age: 73
End: 2023-01-10

## 2023-01-10 NOTE — OP NOTES
[address]   OPERATIVE REPORT    Name:  Negrito Eaton  MR#:  [id number]  :  1950  ACCOUNT #:  [de-identified]  DATE OF SERVICE:  2023    PREOPERATIVE DIAGNOSIS:  Polycystic liver disease. POSTOPERATIVE DIAGNOSIS:  Polycystic liver disease. PROCEDURE PERFORMED:  Robotic hepatic cyst marsupialization x3. SURGEON:  Manuelito Mendoza. Guanakito Richards MD    ASSISTANT:  Tawanna rAnett MD.  Dr. Janell Gates assistance was required secondary to the complex nature of this operation. She assisted throughout with the operation including with closure. ANESTHESIA:  General.    COMPLICATIONS:  None. SPECIMENS REMOVED:  Hepatic cyst wall. Disposition of specimen is to Pathology. IMPLANTS:  None. ESTIMATED BLOOD LOSS:  15 mL. DISPOSITION:  PACU. FINDINGS:  The patient had numerous cysts throughout the liver; however, there were three dominant cysts including two in the left lobe and one in segment VII/VIII that were much larger and appeared to be the cause of his symptoms including abdominal pain, shortness of breath, reflux, and early satiety. The cyst in segment VII/VIII had old hemorrhagic contents within the cyst and external appearance of inflammation. The left lobe cyst contained clear simple fluid. INDICATIONS:  The patient is a 74-year-old gentleman who has a known history of polycystic liver and polycystic kidney disease. He had complaints of right upper quadrant pain making it difficult to take deep breaths as well as complaints of gastric compression causing reflux and early satiety. Imaging revealed findings consistent with his polycystic liver disease; however, he had at least three very large cysts that were appearing to cause compression-type symptoms against his stomach and diaphragm.   Recommendation was made for surgical marsupialization of the cyst.  A complete discussion of risks, benefits, and alternatives of surgery were had with the patient, and he was in agreement to proceed. Informed consent was obtained. PROCEDURE:  After informed consent was obtained, the patient was brought back to the operating room. He was placed under general endotracheal anesthesia in the supine position on the operating room table. His arms were extended bilaterally. His abdomen was prepped and draped in usual sterile fashion. A proper time-out was performed. With this completed, we palpated the abdomen. The patient's cysts were easily palpable in the area just above the umbilicus in the midline. We made a 5-mm incision in the left lateral abdomen and through this incision, we tunneled into the abdomen using a 5-mm Optiview trocar with the scope inserted. The abdomen was entered safely and then insufflated to 15 mmHg. We reinserted the scope and evaluated the abdomen. There were no significant adhesions to the patient's previous umbilical hernia incision. The cysts within the liver were very apparent and large. We then placed additional trocars including three robotic 8-mm trocars across the midabdomen taking care to avoid the periumbilical region at the site of his previous umbilical hernia repair with mesh. The 5-mm trocar in the left midabdomen was exchanged out for an 8-mm AirSeal trocar. Once this was completed, we placed the patient in reverse Trendelenburg position and then docked the [employer] robotic system. Once the robot was docked and the instruments were inserted under direct visualization, I went to the robotic console to control the instruments. We began with the left lobe lesions. There were two dominant lesions in the left lateral segment involving segments II and III that appeared to be compressing against the stomach. These had a very thin wall and simple appearance. The initial cyst was opened using scissors with cautery and clear simple fluid was obtained.   This was suctioned clear and then the second cyst was also opened and aspirated in a similar fashion. These two cysts had adjoining walls. Once this was completed, we then took off the outer cyst wall, leaving behind the relatively normal liver circumferentially. This was done using the vessel sealer device on the robot. There were numerous additional cysts in the left lobe of the liver; however, these were smaller and did not appear to be the cause of the patient's symptoms and so these were left alone. The cyst walls were placed in the midabdomen for retrieval later in the case. We then turned our attention to the lesion in segment VII/VIII of the liver. There were some inflammatory type adhesions to the diaphragm overlying this, and in order to adequately reach this area, we did have to divide the falciform ligament, and this was divided up over the liver to give better access to the cystic lesion. There was a more simple cyst overlying the cyst which appeared to be the cause of the patient's symptoms up against the diaphragm. This was opened and marsupialized in a similar fashion as described before, which gave better exposure to the much larger cyst.  Upon entering this cyst, the wall was slightly thicker but a large amount of brown liquid contents consistent with old hemorrhage were encountered. Again, this was suctioned clear and we then used the vessel sealer device to divide the cyst wall approximately wall 0.5 to 1 cm away from the edge of the normal liver circumferentially. Care was taken to ensure that we were not dividing any major bile duct or vascular structures in doing this, and the cyst wall was then placed with the other two cyst walls. We copiously irrigated this cavity as there were some old hemorrhagic contents within it and this all suctioned clear. There were no signs of any ongoing bleeding. There was no bile leakage. The gallbladder appeared uninflamed and remote from this and so it was left in place.   We then placed the cyst walls into an Endo Catch bag, and these were removed through the left lower quadrant 8-mm AirSeal trocar site. We reinspected and assured good hemostasis and no bile leakage and then the 8-mm AirSeal site was closed to the level of fascia using an 0 Vicryl suture and a suture passer. The specimen was passed off the field. We leveled the table and the [employer] robot system was undocked. The remaining trocars were removed after the abdomen was allowed to desufflate. All skin incisions were then closed using a 4-0 Monocryl subcuticular stitch followed by Dermabond skin adhesive. The patient was then awakened, extubated, and taken to the postanesthesia care unit in stable condition. All needle and instrument counts were correct at the completion of the case. I was present and scrubbed throughout the entirety of the case. There were no immediate complications.       MD RAUL [id number]  D:  01/09/2023 15:24  T:  01/09/2023 23:49  JOB #:  [id number]

## 2023-01-10 NOTE — PROGRESS NOTES
Referral  notification received that CT ordered 12/23/22 has not been scheduled due to 3 failed attempts to reach patient. Patient actually came into ER same day scan was ordered and new ordered was placed and scan completed at that time.

## 2023-01-19 ENCOUNTER — OFFICE VISIT (OUTPATIENT)
Dept: SURGERY | Age: 73
End: 2023-01-19
Payer: MEDICARE

## 2023-01-19 VITALS
BODY MASS INDEX: 31.13 KG/M2 | HEIGHT: 72 IN | OXYGEN SATURATION: 96 % | DIASTOLIC BLOOD PRESSURE: 66 MMHG | HEART RATE: 78 BPM | RESPIRATION RATE: 18 BRPM | WEIGHT: 229.8 LBS | TEMPERATURE: 97.9 F | SYSTOLIC BLOOD PRESSURE: 134 MMHG

## 2023-01-19 DIAGNOSIS — Q44.6 POLYCYSTIC LIVER DISEASE: Primary | ICD-10-CM

## 2023-01-19 PROCEDURE — 99024 POSTOP FOLLOW-UP VISIT: CPT | Performed by: SURGERY

## 2023-01-19 NOTE — PROGRESS NOTES
[facility] Surgical Specialist - Postop Visit    Surgery: 1/6/23 robotic marsupialization of hepatic cysts    Subjective: The patient returns today for follow-up from surgery. Patient is with a history of polycystic liver disease with several large cysts bilaterally that are symptomatic. He underwent a robotic marsupialization of hepatic cysts on January 6. Patient reports today that he continues to be sore in the left lower quadrant trocar site, in still is with decreased appetite compared to normal.  He states that the recovery is harder than he had expected, but is overall doing well. Objective:  Vitals:    01/19/23 0933   BP: 134/66   Pulse: 78   Resp: 18   Temp: 97.9 °F (36.6 °C)   TempSrc: Oral   SpO2: 96%   Weight: 104.2 kg (229 lb 12.8 oz)   Height: 6' (1.829 m)     Sitting in chair, NAD  Anicteric sclera  Normal respirations on RA  RRR  Abdomen soft, ND. Incisions are all well-healed without erythema or drainage. Left lower quadrant incision is mildly tender on palpation. Surgical pathology:  * * *FINAL PATHOLOGIC DIAGNOSIS* * *          Hepatic cyst wall; excision:        Portion of benign multiloculated cyst wall lined by cuboidal to   flattened epithelium with wall fibrosis, chronic inflammation, nest of   residual liver parenchyma and small foci of bile duct proliferation (von   Meyenburg complex); no malignancy identified. 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 He is now approximately 2 weeks postop and is overall doing well. Plan: We reviewed his benign pathology today as well as his progress in his overall recovery. The left lower quadrant trocar site is the most sore as expected given this is a site for specimen extraction and has fascial stitches for closure.   We discussed possible use of abdominal binder for additional support, though he declines at this time given that he has already tried an Ace bandage wraps and only temporarily worked. He is having regular bowel functions. His appetite should continue to improve in the next couple of weeks. He maintains judicious use of his narcotics for pain relief mostly in the evenings getting to bed. We discussed heavy lifting restrictions of 10 pounds for a couple more weeks, at which time he can slowly increase his activity level, including resuming golfing. We reviewed that there are additional smaller cysts that we did not fenestrate/marsupialize during this operation. Should these eventually get bigger and require surgical management, we are happy to see him back. Seen with attending, Dr Codi Muniz. Plan as discussed. Yoseph Mckeon MD  Surgical Oncology Fellow, PGY7    Attending Addendum:   I have independently examined the patient and have reviewed the chart. I agree with the above plan. The patient is doing well after his surgery. He still has some pain in the left lateral incision which is where the specimen was extracted. We discussed that this is expected given the larger size, manipulation at that site and suturing of the fascia. His pathology was reviewed and operative images were shared with the patient. I will plan to see him back as needed in the future. He will call with any questions or concerns.     Ella Spears MD  1/19/2023  3:31 PM

## 2023-01-19 NOTE — PROGRESS NOTES
Identified pt with two pt identifiers (name and ). Reviewed chart in preparation for visit and have obtained necessary documentation. Christophe Mansfield is a 81 y.o. male  Chief Complaint   Patient presents with    Post OP Follow Up     13 days s/p Robotic hepatic cyst marsupialization x3. Visit Vitals  /66 (BP 1 Location: Left upper arm, BP Patient Position: Sitting, BP Cuff Size: Adult long)   Pulse 78   Temp 97.9 °F (36.6 °C) (Oral)   Resp 18   Ht 6' (1.829 m)   Wt 229 lb 12.8 oz (104.2 kg)   SpO2 96%   BMI 31.17 kg/m²       1. Have you been to the ER, urgent care clinic since your last visit? Hospitalized since your last visit? No    2. Have you seen or consulted any other health care providers outside of the [address] [place] [address] since your last visit? Include any pap smears or colon screening.  No

## 2023-01-19 NOTE — LETTER
1/19/2023    Patient: Daniela Bell   YOB: 1950   Date of Visit: 1/19/2023     Guera Pompa MD  [facility] [address] [address]  Via Fax: [phone]    Dear Guera Pompa MD,      Thank you for referring Mr. Theresa Tapia to [facility] for evaluation. My notes for this consultation are attached. If you have questions, please do not hesitate to call me. I look forward to following your patient along with you.       Sincerely,    Rocky Angel MD

## 2023-05-11 ENCOUNTER — OFFICE VISIT (OUTPATIENT)
Age: 73
End: 2023-05-11
Payer: MEDICARE

## 2023-05-11 VITALS
BODY MASS INDEX: 32.37 KG/M2 | HEART RATE: 57 BPM | OXYGEN SATURATION: 97 % | HEIGHT: 72 IN | TEMPERATURE: 97.2 F | DIASTOLIC BLOOD PRESSURE: 82 MMHG | SYSTOLIC BLOOD PRESSURE: 130 MMHG | WEIGHT: 239 LBS

## 2023-05-11 DIAGNOSIS — K76.89 LIVER CYST: Primary | ICD-10-CM

## 2023-05-11 DIAGNOSIS — R97.8 OTHER ABNORMAL TUMOR MARKERS: ICD-10-CM

## 2023-05-11 LAB
ALBUMIN SERPL-MCNC: 4.2 G/DL (ref 3.5–5)
ALBUMIN/GLOB SERPL: 1.4 (ref 1.1–2.2)
ALP SERPL-CCNC: 137 U/L (ref 45–117)
ALT SERPL-CCNC: 40 U/L (ref 12–78)
AST SERPL-CCNC: 31 U/L (ref 15–37)
BILIRUB DIRECT SERPL-MCNC: 0.2 MG/DL (ref 0–0.2)
BILIRUB SERPL-MCNC: 0.7 MG/DL (ref 0.2–1)
GLOBULIN SER CALC-MCNC: 3.1 G/DL (ref 2–4)
PROT SERPL-MCNC: 7.3 G/DL (ref 6.4–8.2)

## 2023-05-11 PROCEDURE — 1036F TOBACCO NON-USER: CPT | Performed by: INTERNAL MEDICINE

## 2023-05-11 PROCEDURE — G8427 DOCREV CUR MEDS BY ELIG CLIN: HCPCS | Performed by: INTERNAL MEDICINE

## 2023-05-11 PROCEDURE — 99214 OFFICE O/P EST MOD 30 MIN: CPT | Performed by: INTERNAL MEDICINE

## 2023-05-11 PROCEDURE — 3074F SYST BP LT 130 MM HG: CPT | Performed by: INTERNAL MEDICINE

## 2023-05-11 PROCEDURE — 3078F DIAST BP <80 MM HG: CPT | Performed by: INTERNAL MEDICINE

## 2023-05-11 PROCEDURE — 1123F ACP DISCUSS/DSCN MKR DOCD: CPT | Performed by: INTERNAL MEDICINE

## 2023-05-11 PROCEDURE — G8417 CALC BMI ABV UP PARAM F/U: HCPCS | Performed by: INTERNAL MEDICINE

## 2023-05-11 PROCEDURE — 3017F COLORECTAL CA SCREEN DOC REV: CPT | Performed by: INTERNAL MEDICINE

## 2023-05-11 RX ORDER — ASCORBIC ACID 500 MG
500 TABLET ORAL 2 TIMES DAILY
COMMUNITY

## 2023-05-11 RX ORDER — HYDROCHLOROTHIAZIDE 25 MG/1
12.5 TABLET ORAL DAILY
COMMUNITY
Start: 2017-04-06

## 2023-05-11 RX ORDER — PROMETHAZINE HYDROCHLORIDE 25 MG/1
1 TABLET ORAL DAILY
COMMUNITY

## 2023-05-11 RX ORDER — IBUPROFEN 600 MG/1
TABLET ORAL EVERY 6 HOURS PRN
COMMUNITY

## 2023-05-11 RX ORDER — ECHINACEA PURPUREA EXTRACT 125 MG
TABLET ORAL
COMMUNITY
Start: 2017-06-13

## 2023-05-11 RX ORDER — CETIRIZINE HYDROCHLORIDE 10 MG/1
1 TABLET ORAL DAILY
COMMUNITY
Start: 2020-02-13

## 2023-05-11 ASSESSMENT — PATIENT HEALTH QUESTIONNAIRE - PHQ9
SUM OF ALL RESPONSES TO PHQ QUESTIONS 1-9: 0
SUM OF ALL RESPONSES TO PHQ9 QUESTIONS 1 & 2: 0
2. FEELING DOWN, DEPRESSED OR HOPELESS: 0
1. LITTLE INTEREST OR PLEASURE IN DOING THINGS: 0
SUM OF ALL RESPONSES TO PHQ QUESTIONS 1-9: 0

## 2023-05-13 LAB — CANCER AG19-9 SERPL-ACNC: 84 U/ML (ref 0–35)

## 2023-05-21 RX ORDER — DOXYCYCLINE 100 MG/1
100 CAPSULE ORAL 2 TIMES DAILY
COMMUNITY

## 2023-05-21 RX ORDER — CANDESARTAN 16 MG/1
TABLET ORAL DAILY
COMMUNITY

## 2023-06-04 PROBLEM — K76.89 HEPATIC CYST: Status: RESOLVED | Noted: 2023-01-06 | Resolved: 2023-06-04

## 2023-06-04 PROBLEM — Q44.6 POLYCYSTIC LIVER DISEASE: Status: ACTIVE | Noted: 2020-04-30

## 2023-06-04 PROBLEM — Q44.6 POLYCYSTIC LIVER DISEASE: Status: RESOLVED | Noted: 2020-04-30 | Resolved: 2023-06-04

## 2023-06-05 ENCOUNTER — TELEPHONE (OUTPATIENT)
Age: 73
End: 2023-06-05

## 2023-06-05 ENCOUNTER — CLINICAL DOCUMENTATION (OUTPATIENT)
Age: 73
End: 2023-06-05

## 2023-06-05 NOTE — TELEPHONE ENCOUNTER
----- Message from Ramy Villa MD sent at 6/4/2023  4:50 AM EDT -----  Regarding: The CA 19-9 which is a cancer protein is still elevted but coning dwn since surgery  I think this is elevated because of the cysts. Just want to be sure so want to repeat CT scan in 6 months and repeat CA 19-9 again in 6 months then come back and see me. Set up appt in 6 months.   TRICIA

## 2023-06-06 ENCOUNTER — TELEPHONE (OUTPATIENT)
Age: 73
End: 2023-06-06

## 2023-06-06 NOTE — TELEPHONE ENCOUNTER
----- Message from Donnie Parsons MD sent at 6/4/2023  4:50 AM EDT -----  Regarding: The CA 19-9 which is a cancer protein is still elevted but coning dwcindy since surgery  I think this is elevated because of the cysts. Just want to be sure so want to repeat CT scan in 6 months and repeat CA 19-9 again in 6 months then come back and see me. Set up appt in 6 months. MLS [email or web address] Spoke w/patient --at this time he is out of town and phone connection is bad. Will call patient back once in town. (KF)

## 2023-06-22 DIAGNOSIS — K76.89 LIVER CYST: Primary | ICD-10-CM

## 2023-06-22 DIAGNOSIS — R97.8 OTHER ABNORMAL TUMOR MARKERS: ICD-10-CM

## 2023-06-23 ENCOUNTER — TELEPHONE (OUTPATIENT)
Age: 73
End: 2023-06-23

## 2023-07-27 ENCOUNTER — HOSPITAL ENCOUNTER (OUTPATIENT)
Facility: HOSPITAL | Age: 73
Discharge: HOME | End: 2023-07-27
Payer: MEDICARE

## 2023-07-27 DIAGNOSIS — K76.89 LIVER CYST: ICD-10-CM

## 2023-07-27 LAB — CREAT BLD-MCNC: 1.6 MG/DL (ref 0.6–1.3)

## 2023-07-27 PROCEDURE — A9577 INJ MULTIHANCE: HCPCS | Performed by: INTERNAL MEDICINE

## 2023-07-27 PROCEDURE — 82565 ASSAY OF CREATININE: CPT

## 2023-07-27 PROCEDURE — 6360000004 HC RX CONTRAST MEDICATION: Performed by: INTERNAL MEDICINE

## 2023-07-27 PROCEDURE — 74183 MRI ABD W/O CNTR FLWD CNTR: CPT

## 2023-07-27 RX ADMIN — GADOBENATE DIMEGLUMINE 20 ML: 529 INJECTION, SOLUTION INTRAVENOUS at 09:25

## 2023-07-28 ENCOUNTER — TELEPHONE (OUTPATIENT)
Age: 73
End: 2023-07-28

## 2023-08-11 NOTE — TELEPHONE ENCOUNTER
Called and discussed.
Patient called requesting to discuss recent imaging results as soon as possible.
Statement Selected

## 2023-12-28 ENCOUNTER — OFFICE VISIT (OUTPATIENT)
Age: 73
End: 2023-12-28
Payer: MEDICARE

## 2023-12-28 VITALS
DIASTOLIC BLOOD PRESSURE: 74 MMHG | WEIGHT: 236 LBS | OXYGEN SATURATION: 97 % | SYSTOLIC BLOOD PRESSURE: 133 MMHG | TEMPERATURE: 97 F | HEART RATE: 64 BPM | BODY MASS INDEX: 31.97 KG/M2 | HEIGHT: 72 IN

## 2023-12-28 DIAGNOSIS — K76.89 LIVER CYST: Primary | ICD-10-CM

## 2023-12-28 DIAGNOSIS — R97.8 OTHER ABNORMAL TUMOR MARKERS: ICD-10-CM

## 2023-12-28 PROCEDURE — 99213 OFFICE O/P EST LOW 20 MIN: CPT | Performed by: INTERNAL MEDICINE

## 2023-12-28 PROCEDURE — 1036F TOBACCO NON-USER: CPT | Performed by: INTERNAL MEDICINE

## 2023-12-28 PROCEDURE — 3078F DIAST BP <80 MM HG: CPT | Performed by: INTERNAL MEDICINE

## 2023-12-28 PROCEDURE — G8427 DOCREV CUR MEDS BY ELIG CLIN: HCPCS | Performed by: INTERNAL MEDICINE

## 2023-12-28 PROCEDURE — G8484 FLU IMMUNIZE NO ADMIN: HCPCS | Performed by: INTERNAL MEDICINE

## 2023-12-28 PROCEDURE — 1123F ACP DISCUSS/DSCN MKR DOCD: CPT | Performed by: INTERNAL MEDICINE

## 2023-12-28 PROCEDURE — G8417 CALC BMI ABV UP PARAM F/U: HCPCS | Performed by: INTERNAL MEDICINE

## 2023-12-28 PROCEDURE — 3017F COLORECTAL CA SCREEN DOC REV: CPT | Performed by: INTERNAL MEDICINE

## 2023-12-28 PROCEDURE — 3075F SYST BP GE 130 - 139MM HG: CPT | Performed by: INTERNAL MEDICINE

## 2023-12-28 NOTE — PROGRESS NOTES
[facility]      Jimmy Dunaway MD, FACP, FACG, FAASLD    MD rBenda Swift, PA-CINDA Amador, [facility]-BC     Nicole Malhotra, [facility]   Ab Waite, [facility]-C    Traci Dickson, [facility]    at [facility]    [address], [address]    [place]  [address] [phone]    FAX: [phone]   [facility]    at [facility]    [address], [address]    [place]  [address] [phone]    FAX: [phone]       Patient Care Team:  Yamile Navarro MD as PCP - Emmanuel Barrios MD (General Surgery)      Patient Active Problem List   Diagnosis    Polycystic liver disease    Hypertension    Polycystic renal disease    Venous insufficiency of both lower extremities    Allergic rhinitis    Chronic ulcer of skin (HCC)    Coronary atherosclerosis    Hypercholesterolemia    Obesity    Old bucket handle tear of medial meniscus    Old disruption of medial collateral ligament    Psychosexual dysfunction with inhibited sexual excitement    Rash and other nonspecific skin eruption    Regular astigmatism       Alec Gordon is being seen at [facility] for management of Polycystic liver disease.  The active problem list, all pertinent past medical history, medications, radiologic findings and laboratory findings related to the liver disorder were reviewed and discussed with the patient.      The patient is a 72 y.o.  male who was found to have polycystic liver and kidney disease in 2/2020 when he underwent an ultrasound and then CT for evaluation of non-specific right sided abdominal pain     He had 2 large cysts in the right lobe measuring 10 x 11 cm and left lobe 10 x 15 cm    The patient underwent marsupialization of the 2 largest cysts by Dr Priest in

## 2023-12-28 NOTE — PROGRESS NOTES
Identified pt with two pt identifiers(name and ). Reviewed record in preparation for visit and have obtained necessary documentation.    Chief Complaint   Patient presents with    Follow-up     /74 (Site: Left Upper Arm, Position: Sitting, Cuff Size: Large Adult)   Pulse 64   Temp 97 °F (36.1 °C) (Temporal)   Ht 1.829 m (6')   Wt 107 kg (236 lb)   SpO2 97%   BMI 32.01 kg/m²       1. \"Have you been to the ER, urgent care clinic since your last visit?  Hospitalized since your last visit?\" No    2. \"Have you seen or consulted any other health care providers outside of the [facility] since your last visit?\"  Yes PER pt for check up visit     Patient is accompanied by self and wife I have received verbal consent from Alec Gordon to discuss any/all medical information while they are present in the room.    Per pt he had eye surgery for dry eyes on 2023 at  [facility]

## 2023-12-30 LAB — CANCER AG19-9 SERPL-ACNC: 135 U/ML (ref 0–35)

## 2024-01-03 ENCOUNTER — TELEPHONE (OUTPATIENT)
Age: 74
End: 2024-01-03

## 2024-01-03 NOTE — TELEPHONE ENCOUNTER
----- Message from Bryanna Allen sent at 1/3/2024 11:40 AM EST -----  Regarding: FW: CT Scan  Contact: [phone]    ----- Message -----  From: Alec Gordon  Sent: 1/3/2024  11:17 AM EST  To: #  Subject: CT Scan                                          My portal shows an open order for CT w/wo contrast.  Is this new as a result of the increase in the tumor marker?        1/3/24 @1202 CT was ordered a routine 6 month scan with repeat Ca 19-9 which was done. Central Scheduling number given for patient to schedule appt.(KF)

## 2024-01-09 ENCOUNTER — TELEPHONE (OUTPATIENT)
Age: 74
End: 2024-01-09

## 2024-01-09 PROBLEM — T46.5X5A: Status: RESOLVED | Noted: 2022-11-15 | Resolved: 2024-01-09

## 2024-01-09 PROBLEM — R94.31 ABNORMAL ELECTROCARDIOGRAPHY: Status: RESOLVED | Noted: 2017-08-11 | Resolved: 2024-01-09

## 2024-01-09 PROBLEM — M77.10 LATERAL EPICONDYLITIS OF ELBOW: Status: RESOLVED | Noted: 2022-11-15 | Resolved: 2024-01-09

## 2024-01-09 PROBLEM — H52.10 MYOPIA: Status: RESOLVED | Noted: 2022-11-15 | Resolved: 2024-01-09

## 2024-01-09 PROBLEM — M79.609 PAIN IN SOFT TISSUES OF LIMB: Status: RESOLVED | Noted: 2022-11-15 | Resolved: 2024-01-09

## 2024-01-09 PROBLEM — I10 ESSENTIAL HYPERTENSION: Status: RESOLVED | Noted: 2017-08-11 | Resolved: 2024-01-09

## 2024-01-09 PROBLEM — H52.4 PRESBYOPIA: Status: RESOLVED | Noted: 2022-11-15 | Resolved: 2024-01-09

## 2024-01-09 PROBLEM — M25.569 KNEE PAIN: Status: RESOLVED | Noted: 2019-10-21 | Resolved: 2024-01-09

## 2024-01-09 PROBLEM — E78.5 OTHER AND UNSPECIFIED HYPERLIPIDEMIA: Status: RESOLVED | Noted: 2022-11-15 | Resolved: 2024-01-09

## 2024-01-09 PROBLEM — T14.8XXA CONTUSION: Status: RESOLVED | Noted: 2022-11-15 | Resolved: 2024-01-09

## 2024-01-09 PROBLEM — R49.8 OTHER VOICE DISTURBANCE: Status: RESOLVED | Noted: 2022-11-15 | Resolved: 2024-01-09

## 2024-01-09 PROBLEM — L98.8 OTHER SPECIFIED DERMATOSES: Status: RESOLVED | Noted: 2022-11-15 | Resolved: 2024-01-09

## 2024-01-09 NOTE — TELEPHONE ENCOUNTER
----- Message from Jimmy Dunaway MD sent at 1/9/2024  5:20 AM EST -----  Regarding: CA 19-9 cancer marker still mildly elevated.  ALP bile duct liver enzymes still elevated  Get MRI of liver to look for bile duct disease to explain the increased CA 19-9 and ALP.  Most likely will be normal and due to cysts pushing on bile ducts.    FU NP after scans and repeat labs in 3 months  MLS        1/8/24@1102 Spoke w/patient concerning above message from . Patient is out of town until next month. Will have MRI once back in town the end of the month or first of Feb. Advise patient to call our office once date of MRI is known and our office will give him a 3 month follow up. Labs will be mailed to patient this week. Patient verbalize understanding.(KF)

## 2024-03-05 ENCOUNTER — CLINICAL DOCUMENTATION (OUTPATIENT)
Age: 74
End: 2024-03-05

## 2024-03-05 NOTE — PROGRESS NOTES
Patient called to notify that his MRI is scheduled for 4/8/24. Called patient to schedule 3 month f/u with NP, per MLS. Patient stated, that he will call back to schedule an appt in July because he has no idea as to when he is available .

## 2024-04-08 ENCOUNTER — HOSPITAL ENCOUNTER (OUTPATIENT)
Facility: HOSPITAL | Age: 74
Discharge: HOME | End: 2024-04-11
Payer: MEDICARE

## 2024-04-08 DIAGNOSIS — K76.89 LIVER CYST: ICD-10-CM

## 2024-04-08 DIAGNOSIS — R97.8 OTHER ABNORMAL TUMOR MARKERS: ICD-10-CM

## 2024-04-08 LAB
ALBUMIN SERPL-MCNC: 4.2 G/DL (ref 3.5–5)
ALBUMIN/GLOB SERPL: 1.1 (ref 1.1–2.2)
ALP SERPL-CCNC: 141 U/L (ref 45–117)
ALT SERPL-CCNC: 42 U/L (ref 12–78)
ANION GAP SERPL CALC-SCNC: 2 MMOL/L (ref 5–15)
AST SERPL W P-5'-P-CCNC: 27 U/L (ref 15–37)
BILIRUB SERPL-MCNC: 1 MG/DL (ref 0.2–1)
BUN SERPL-MCNC: 28 MG/DL (ref 6–20)
BUN/CREAT SERPL: 17 (ref 12–20)
CA-I BLD-MCNC: 9.8 MG/DL (ref 8.5–10.1)
CEA SERPL-MCNC: 2.5 NG/ML
CHLORIDE SERPL-SCNC: 107 MMOL/L (ref 97–108)
CO2 SERPL-SCNC: 30 MMOL/L (ref 21–32)
CREAT BLD-MCNC: 1.6 MG/DL (ref 0.6–1.3)
CREAT SERPL-MCNC: 1.66 MG/DL (ref 0.7–1.3)
GLOBULIN SER CALC-MCNC: 4 G/DL (ref 2–4)
GLUCOSE SERPL-MCNC: 120 MG/DL (ref 65–100)
POTASSIUM SERPL-SCNC: 3.9 MMOL/L (ref 3.5–5.1)
PROT SERPL-MCNC: 8.2 G/DL (ref 6.4–8.2)
SODIUM SERPL-SCNC: 139 MMOL/L (ref 136–145)

## 2024-04-08 PROCEDURE — 80053 COMPREHEN METABOLIC PANEL: CPT

## 2024-04-08 PROCEDURE — 36415 COLL VENOUS BLD VENIPUNCTURE: CPT

## 2024-04-08 PROCEDURE — 6360000004 HC RX CONTRAST MEDICATION: Performed by: INTERNAL MEDICINE

## 2024-04-08 PROCEDURE — A9577 INJ MULTIHANCE: HCPCS | Performed by: INTERNAL MEDICINE

## 2024-04-08 PROCEDURE — 74183 MRI ABD W/O CNTR FLWD CNTR: CPT

## 2024-04-08 PROCEDURE — 82378 CARCINOEMBRYONIC ANTIGEN: CPT

## 2024-04-08 PROCEDURE — 86301 IMMUNOASSAY TUMOR CA 19-9: CPT

## 2024-04-08 RX ADMIN — GADOBENATE DIMEGLUMINE 20 ML: 529 INJECTION, SOLUTION INTRAVENOUS at 09:31

## 2024-04-10 LAB — CANCER AG19-9 SERPL-ACNC: 99 U/ML (ref 0–35)

## 2024-06-13 ENCOUNTER — OFFICE VISIT (OUTPATIENT)
Age: 74
End: 2024-06-13
Payer: MEDICARE

## 2024-06-13 VITALS
BODY MASS INDEX: 32.29 KG/M2 | RESPIRATION RATE: 18 BRPM | OXYGEN SATURATION: 98 % | DIASTOLIC BLOOD PRESSURE: 88 MMHG | TEMPERATURE: 97.8 F | HEART RATE: 68 BPM | WEIGHT: 238.4 LBS | HEIGHT: 72 IN | SYSTOLIC BLOOD PRESSURE: 135 MMHG

## 2024-06-13 DIAGNOSIS — Q44.6 POLYCYSTIC LIVER DISEASE: Primary | ICD-10-CM

## 2024-06-13 PROCEDURE — 3075F SYST BP GE 130 - 139MM HG: CPT | Performed by: INTERNAL MEDICINE

## 2024-06-13 PROCEDURE — G8417 CALC BMI ABV UP PARAM F/U: HCPCS | Performed by: INTERNAL MEDICINE

## 2024-06-13 PROCEDURE — 1036F TOBACCO NON-USER: CPT | Performed by: INTERNAL MEDICINE

## 2024-06-13 PROCEDURE — 3079F DIAST BP 80-89 MM HG: CPT | Performed by: INTERNAL MEDICINE

## 2024-06-13 PROCEDURE — 99213 OFFICE O/P EST LOW 20 MIN: CPT | Performed by: INTERNAL MEDICINE

## 2024-06-13 PROCEDURE — 1123F ACP DISCUSS/DSCN MKR DOCD: CPT | Performed by: INTERNAL MEDICINE

## 2024-06-13 PROCEDURE — 3017F COLORECTAL CA SCREEN DOC REV: CPT | Performed by: INTERNAL MEDICINE

## 2024-06-13 PROCEDURE — G8427 DOCREV CUR MEDS BY ELIG CLIN: HCPCS | Performed by: INTERNAL MEDICINE

## 2024-06-13 RX ORDER — TRAMADOL HYDROCHLORIDE 50 MG/1
TABLET ORAL
COMMUNITY
Start: 2024-02-08

## 2024-06-13 RX ORDER — CODEINE PHOSPHATE AND GUAIFENESIN 10; 100 MG/5ML; MG/5ML
SOLUTION ORAL
COMMUNITY
Start: 2024-05-14

## 2024-06-13 ASSESSMENT — PATIENT HEALTH QUESTIONNAIRE - PHQ9
SUM OF ALL RESPONSES TO PHQ QUESTIONS 1-9: 0
2. FEELING DOWN, DEPRESSED OR HOPELESS: NOT AT ALL
SUM OF ALL RESPONSES TO PHQ QUESTIONS 1-9: 0
1. LITTLE INTEREST OR PLEASURE IN DOING THINGS: NOT AT ALL
SUM OF ALL RESPONSES TO PHQ QUESTIONS 1-9: 0
DEPRESSION UNABLE TO ASSESS: FUNCTIONAL CAPACITY MOTIVATION LIMITS ACCURACY
SUM OF ALL RESPONSES TO PHQ9 QUESTIONS 1 & 2: 0
SUM OF ALL RESPONSES TO PHQ QUESTIONS 1-9: 0

## 2024-06-13 NOTE — PROGRESS NOTES
[facility]      Jimmy Dunaway MD, FACP, FACG, FAASLD    MD Brenda Swift, PA-CINDA Am[facility], [facility]-BC     Nicole Malhotra, [facility]   Ab Waite, [facility]-C    Traci Dickson, [facility]    at [facility]    [address], [address]    [place]  [address] [phone]    FAX: [phone]   [facility]    at [facility]    [address], [address]    [place]  [address] [phone]    FAX: [phone]       Patient Care Team:  Yamile Navarro MD as PCP - Emmanuel Barrios MD (General Surgery)      Patient Active Problem List   Diagnosis    Polycystic liver disease    Hypertension    Polycystic renal disease    Venous insufficiency of both lower extremities    Allergic rhinitis    Chronic ulcer of skin (HCC)    Coronary atherosclerosis    Hypercholesterolemia    Obesity    Old bucket handle tear of medial meniscus    Old disruption of medial collateral ligament    Psychosexual dysfunction with inhibited sexual excitement    Rash and other nonspecific skin eruption    Regular astigmatism       Alec Gordon is being seen at [facility] for management of Polycystic liver disease.      The active problem list, all pertinent past medical history, medications, radiologic findings and laboratory findings related to the liver disorder were reviewed and discussed with the patient.      The patient is a 72 y.o.  male who was found to have polycystic liver and kidney disease in 2/2020 when he underwent an ultrasound and then CT for evaluation of non-specific right sided abdominal pain     He had 2 large cysts in the right lobe measuring 10 x 11 cm and left lobe 10 x 15 cm    The patient underwent marsupialization of the 2 largest cysts by Dr Priest in

## 2024-06-13 NOTE — PROGRESS NOTES
Identified pt with two pt identifiers(name and ). Reviewed record in preparation for visit and have obtained necessary documentation.  Vitals:    24 0950 24 0954   BP: (!) 144/85 135/88   Pulse: 68    Resp: 18    Temp: 97.8 °F (36.6 °C)    TempSrc: Temporal    SpO2: 98%    Weight: 108.1 kg (238 lb 6.4 oz)    Height: 1.829 m (6')         Health Maintenance Review: Patient reminded of \"due or due soon\" health maintenance. I have asked the patient to contact his/her primary care provider (PCP) for follow-up on his/her health maintenance.    Coordination of Care Questionnaire:  :   1) Have you been to an emergency room, urgent care, or hospitalized since your last visit?  If yes, where when, and reason for visit? no       2. Have seen or consulted any other health care provider since your last visit?   If yes, where when, and reason for visit?  No      Patient is accompanied by self I have received verbal consent from Alec Gordon to discuss any/all medical information while they are present in the room.

## (undated) DEVICE — GARMENT,MEDLINE,DVT,INT,CALF,MED, GEN2: Brand: MEDLINE

## (undated) DEVICE — Device

## (undated) DEVICE — OBTRTR BLDELSS OPT 8MM DISP -- DA VINICI XI - SNGL USE

## (undated) DEVICE — INSUFFLATION NEEDLE TO ESTABLISH PNEUMOPERITONEUM.: Brand: INSUFFLATION NEEDLE

## (undated) DEVICE — TRI-LUMEN FILTERED TUBE SET WITH ACTIVATED CHARCOAL FILTER: Brand: AIRSEAL

## (undated) DEVICE — GLOVE ORANGE PI 7 1/2   MSG9075

## (undated) DEVICE — ELECTRODE PT RET AD L9FT HI MOIST COND ADH HYDRGEL CORDED

## (undated) DEVICE — HYPODERMIC SAFETY NEEDLE: Brand: MAGELLAN

## (undated) DEVICE — VESSEL SEALER: Brand: ENDOWRIST;DAVINCI SI

## (undated) DEVICE — SUTURE PDS II SZ 1 L27IN ABSRB VLT CT-1 L36MM 1/2 CIR Z341H

## (undated) DEVICE — SEAL UNIV 5-8MM DISP BX/10 -- DA VINCI XI - SNGL USE

## (undated) DEVICE — SUTURE VCRL SZ 3-0 L27IN ABSRB UD L26MM SH 1/2 CIR J416H

## (undated) DEVICE — PACK,BASIC,SIRUS,V: Brand: MEDLINE

## (undated) DEVICE — SUTURE VCRL SZ 0 L27IN ABSRB UD L36MM CT-1 1/2 CIR J260H

## (undated) DEVICE — SUTURE MCRYL SZ 4-0 L27IN ABSRB UD L24MM PS-1 3/8 CIR PRIM Y935H

## (undated) DEVICE — SUTURE NONABSORBABLE MONOFILAMENT 2-0 FS 18 IN ETHILON 664H

## (undated) DEVICE — GENERAL LAPAROSCOPY - SMH: Brand: MEDLINE INDUSTRIES, INC.

## (undated) DEVICE — SUTURE PDS II SZ 0 L36IN ABSRB VLT L36MM CT-1 1/2 CIR Z346H

## (undated) DEVICE — SOLUTION IRRIG 1000ML 0.9% SOD CHL USP POUR PLAS BTL

## (undated) DEVICE — TROCAR: Brand: KII FIOS FIRST ENTRY

## (undated) DEVICE — HANDLE LT SNAP ON ULT DURABLE LENS FOR TRUMPF ALC DISPOSABLE

## (undated) DEVICE — TISSUE RETRIEVAL SYSTEM: Brand: INZII RETRIEVAL SYSTEM

## (undated) DEVICE — SUTURE SZ 0 27IN 5/8 CIR UR-6  TAPER PT VIOLET ABSRB VICRYL J603H

## (undated) DEVICE — ADHESIVE SKIN CLSR 0.7ML TOP DERMBND ADV

## (undated) DEVICE — ARM DRAPE

## (undated) DEVICE — GLOVE SURG SZ 8 L12IN FNGR THK79MIL GRN LTX FREE

## (undated) DEVICE — AIRSEAL 12 MM ACCESS PORT AND PALM GRIP OBTURATOR WITH BLADELESS OPTICAL TIP, 120 MM LENGTH: Brand: AIRSEAL

## (undated) DEVICE — TOTAL TRAY, 16FR 10ML SIL FOLEY, URN: Brand: MEDLINE

## (undated) DEVICE — SOLUTION ANTIFOG VIS SYS CLEARIFY LAPSCP